# Patient Record
Sex: FEMALE | Race: WHITE | NOT HISPANIC OR LATINO | Employment: OTHER | URBAN - METROPOLITAN AREA
[De-identification: names, ages, dates, MRNs, and addresses within clinical notes are randomized per-mention and may not be internally consistent; named-entity substitution may affect disease eponyms.]

---

## 2017-05-18 ENCOUNTER — APPOINTMENT (EMERGENCY)
Dept: RADIOLOGY | Facility: HOSPITAL | Age: 72
End: 2017-05-18
Payer: MEDICARE

## 2017-05-18 ENCOUNTER — HOSPITAL ENCOUNTER (EMERGENCY)
Facility: HOSPITAL | Age: 72
Discharge: HOME | End: 2017-05-18
Attending: EMERGENCY MEDICINE | Admitting: EMERGENCY MEDICINE
Payer: MEDICARE

## 2017-05-18 VITALS
BODY MASS INDEX: 27.43 KG/M2 | TEMPERATURE: 97.6 F | HEIGHT: 61 IN | RESPIRATION RATE: 16 BRPM | HEART RATE: 66 BPM | WEIGHT: 145.28 LBS | DIASTOLIC BLOOD PRESSURE: 95 MMHG | SYSTOLIC BLOOD PRESSURE: 166 MMHG | OXYGEN SATURATION: 98 %

## 2017-05-18 DIAGNOSIS — S92.909A FOOT FRACTURE: Primary | ICD-10-CM

## 2017-05-18 DIAGNOSIS — S92.309A METATARSAL FRACTURE: ICD-10-CM

## 2017-05-18 PROCEDURE — 73610 X-RAY EXAM OF ANKLE: CPT

## 2017-05-18 PROCEDURE — 99283 EMERGENCY DEPT VISIT LOW MDM: CPT

## 2017-05-18 PROCEDURE — 73630 X-RAY EXAM OF FOOT: CPT

## 2017-05-18 RX ORDER — ACYCLOVIR 400 MG/1
400 TABLET ORAL DAILY
COMMUNITY

## 2017-05-18 RX ORDER — LEVOTHYROXINE SODIUM 0.05 MG/1
50 TABLET ORAL DAILY
COMMUNITY

## 2017-05-18 RX ORDER — PROPRANOLOL HYDROCHLORIDE 20 MG/1
20 TABLET ORAL DAILY
COMMUNITY

## 2017-05-18 RX ORDER — ASPIRIN 81 MG/1
81 TABLET, CHEWABLE ORAL DAILY
COMMUNITY

## 2017-11-13 ENCOUNTER — ALLSCRIPTS OFFICE VISIT (OUTPATIENT)
Dept: OTHER | Facility: OTHER | Age: 72
End: 2017-11-13

## 2017-11-13 RX ORDER — MAGNESIUM 30 MG
30 TABLET ORAL 2 TIMES DAILY
COMMUNITY

## 2017-11-13 RX ORDER — MULTIVIT WITH MINERALS/LUTEIN
1000 TABLET ORAL DAILY
COMMUNITY

## 2017-11-13 RX ORDER — NAPROXEN 375 MG/1
250 TABLET ORAL 2 TIMES DAILY WITH MEALS
COMMUNITY
End: 2018-12-11

## 2017-11-13 RX ORDER — OMEGA-3 FATTY ACIDS CAP DELAYED RELEASE 1000 MG 1000 MG
CAPSULE DELAYED RELEASE ORAL
COMMUNITY

## 2017-11-13 RX ORDER — IBUPROFEN 200 MG
1 CAPSULE ORAL DAILY
COMMUNITY

## 2017-11-14 ENCOUNTER — ANESTHESIA EVENT (OUTPATIENT)
Dept: PERIOP | Facility: HOSPITAL | Age: 72
End: 2017-11-14
Payer: MEDICARE

## 2017-11-14 RX ORDER — SODIUM CHLORIDE, SODIUM LACTATE, POTASSIUM CHLORIDE, CALCIUM CHLORIDE 600; 310; 30; 20 MG/100ML; MG/100ML; MG/100ML; MG/100ML
75 INJECTION, SOLUTION INTRAVENOUS CONTINUOUS
Status: CANCELLED | OUTPATIENT
Start: 2017-11-14

## 2017-11-14 NOTE — PROGRESS NOTES
Assessment    1  Menopausal symptoms (627 2) (N95 1)   2  Encounter for gynecological examination without abnormal finding (V72 31) (Z01 419)    Plan  Menopausal symptoms    · Clotrimazole-Betamethasone 1-0 05 % External Cream; APPLY  AND RUB  IN ATHIN FILM TO AFFECTED AREAS TWICE DAILY  (AM AND PM)   Rx By: Harvey Allred; Dispense: 0 Days ; #:1 X 45 GM Tube; Refill: 0;For: Menopausal symptoms; RUDY = N; Verified Transmission to [facility]; Last Updated By: System, Atavist; 11/13/2017 8:24:59 PM   · Menest 0 3 MG Oral Tablet; TAKE 1 TABLET DAILY AS DIRECTED   Rx By: Harvey Allred; Dispense: 30 Days ; #:30 Tablet; Refill: 11; Menopausal symptoms; RUDY = N; Verified Transmission to [facility]; Last Updated By: System Atavist; 11/13/2017 8:25:00 PM    Discussion/Summary  healthy adult female cervical cancer screening is not indicated Breast cancer screening: mammogram has been ordered  Colorectal cancer screening: colorectal cancer screening is current  Osteoporosis screening: bone mineral density testing is current  Advice and education were given regarding weight bearing exercise, calcium supplements and vitamin D supplements  Annual exam performedrx Arty Craw year  The patient has the current Goals: Mammo, RTO 1 year  The patent has the current Barriers: None  Patient is able to Self-Care  Self Referrals: Yes      Chief Complaint  Pt here for yearly doing well today  History of Present Illness  HPI: 74 y/o [de-identified] female here for a new patient annual GYN exam  No menses, pt had supracervical hysterectomy BSO 1999 secondary to fibroids and ovarian cysts  Pt denies any vaginal bleeding or problems since surgery  Not sexually active at this time  Pt states she had abnl pap in late 1970s then had colpo which showed PIPPA 2-3, and cone biopsy, pt states since then paps all normal  last mammo 8/2017 WNL    on menest 0 3mg daily for menopausal symptoms and pt feels great on it does not want to d/c it  wy-awgrvnybonpivzli-xmebfgsyqlex 1999    GYN HM, Adult Female Ugo Hogan: The patient is being seen for a Annual Gyn Exam evaluation  General Health: The patient's health since the last visit is described as good  Lifestyle:  She does not have any weight concerns  -- She exercises regularly  She exercises 3 or more times per week  Exercise includes walking -- She does not use tobacco  The patient is a former cigarette smoker  Tobacco Use Duration: cigarette pack(s) per day-- and-- quit 17 yrs ago  -- She consumes alcohol  She reports occasional alcohol use  -- She denies drug use  She has never used illicit drugs  Reproductive health: the patient is postmenopausal--   she reports no menstrual problems  -- she uses no contraception  -- she is not sexually active  -- she denies prior pregnancies G 0  Screening: Cervical cancer screening includes uncertain timing of her last pap smear,-- uncertain timing of her last human papilloma virus screening-- and-- uncertain timing of her last colposcopy  Breast cancer screening includes a mammogram performed 9/9/2017  Colorectal cancer screening includes a colonoscopy performed 5 yrs ago  Review of Systems   Constitutional: no fever-- and-- no chills  Cardiovascular: no chest pain  Respiratory: no shortness of breath  Breasts: no breast pain-- and-- no breast lump  Gastrointestinal: no abdominal pain  Genitourinary: no dysuria,-- no pelvic pain,-- no vaginal discharge,-- no dysmenorrhea-- and-- no unexplained vaginal bleeding  Active Problems  1  Visit for screening mammogram (V76 12) (Z12 31)    Past Medical History   · History of arthritis (V13 4) (Z87 39)   · History of migraine headaches (V12 49) (Z86 69)    The active problems and past medical history were reviewed and updated today  Surgical History   · History of Hysterectomy    The surgical history was reviewed and updated today         Family History  Mother    · Family history of hypertension (V17 49) (Z82 49)  Father    · Family history of Bowel disease   · Family history of arthritis (V17 7) (Z82 61)   · Family history of colon cancer (V16 0) (Z80 0)   · Family history of lung cancer (V16 1) (Z80 1)  Brother    · Family history of diabetes mellitus (V18 0) (Z83 3)   · Family history of hypertension (V17 49) (Z82 49)   · Family history of kidney disease (V18 69) (Z84 1)   · Family history of migraine headaches (V17 2) (Z82 0)   · Family history of Headache    The family history was reviewed and updated today  Social History     · Former smoker (A48 20) (S93 103)   · Social alcohol use (Z78 9)   ·  (V61 07) (Z63 4)  The social history was reviewed and updated today  Current Meds   1  Acyclovir 400 MG Oral Tablet; Therapy: (Recorded:13Nov2017) to Recorded   2  Baby Aspirin 81 MG CHEW; Therapy: (Recorded:13Nov2017) to Recorded   3  Calcium CAPS; Therapy: (Recorded:13Nov2017) to Recorded   4  Fish Oil CAPS; Therapy: (Recorded:13Nov2017) to Recorded   5  Inderal 20 MG TABS; Therapy: (Recorded:13Nov2017) to Recorded   6  Magnesium TABS; Therapy: (Recorded:13Nov2017) to Recorded   7  Multivitamins TABS; Therapy: (Recorded:13Nov2017) to Recorded   8  Naproxen TABS; Therapy: (Recorded:13Nov2017) to Recorded   9  Synthroid 50 MCG Oral Tablet; Therapy: (Recorded:13Nov2017) to Recorded   10  Vitamin C TABS; Therapy: (Recorded:13Nov2017) to Recorded   11  Vitamin D3 CAPS; Therapy: (Recorded:13Nov2017) to Recorded    Allergies  1  No Known Drug Allergies    Vitals   Recorded: [id number] 99:11ZR   Systolic 588, RUE, Sitting   Diastolic 68, RUE, Sitting   Height 5 ft    Weight 147 lb    BMI Calculated 28 71   BSA Calculated 1 64   LMP Hyst        Physical Exam   Constitutional  General appearance: No acute distress, well appearing and well nourished  Neck  Thyroid: Normal, no thyromegaly  Pulmonary  Respiratory effort: No increased work of breathing or signs of respiratory distress  Auscultation of lungs: Clear to auscultation  Cardiovascular  Auscultation of heart: Normal rate and rhythm, normal S1 and S2, no murmurs  Genitourinary  External genitalia: Normal and no lesions appreciated  Vagina: Normal, no lesions or dryness appreciated  Urethra: Normal    Urethral meatus: Normal    Bladder: Normal, soft, non-tender and no prolapse or masses appreciated  Cervix: Normal, no palpable masses  Uterus: Surgically absent  Adnexa/parametria: Surgically absent  Chest  Breasts: Normal and no dimpling or skin changes noted  Abdomen  Abdomen: Normal, non-tender, and no organomegaly noted  Psychiatric  Orientation to person, place, and time: Normal    Mood and affect: Normal        Future Appointments    Date/Time Provider Specialty Site   11/15/2017 10:15 AM Rebekah Guzman MD Gastroenterology Adult [address]   02/19/2018 10:30 AM MARIE Monique   Dermatology [facility]       Signatures   Electronically signed by : Manpreet Torres, [facility]; Nov 13 2017  8:37PM EST                       (Author)    Electronically signed by : MARIE Jacome ; Nov 13 2017  8:41PM EST                       (Author)

## 2017-11-15 ENCOUNTER — GENERIC CONVERSION - ENCOUNTER (OUTPATIENT)
Dept: OTHER | Facility: OTHER | Age: 72
End: 2017-11-15

## 2017-11-15 ENCOUNTER — ANESTHESIA (OUTPATIENT)
Dept: PERIOP | Facility: HOSPITAL | Age: 72
End: 2017-11-15
Payer: MEDICARE

## 2017-11-15 ENCOUNTER — HOSPITAL ENCOUNTER (OUTPATIENT)
Facility: HOSPITAL | Age: 72
Setting detail: OUTPATIENT SURGERY
Discharge: HOME | End: 2017-11-15
Attending: INTERNAL MEDICINE | Admitting: INTERNAL MEDICINE
Payer: MEDICARE

## 2017-11-15 VITALS
SYSTOLIC BLOOD PRESSURE: 123 MMHG | BODY MASS INDEX: 27.18 KG/M2 | OXYGEN SATURATION: 98 % | DIASTOLIC BLOOD PRESSURE: 61 MMHG | WEIGHT: 143.96 LBS | TEMPERATURE: 97.1 F | HEIGHT: 61 IN | HEART RATE: 65 BPM | RESPIRATION RATE: 18 BRPM

## 2017-11-15 RX ORDER — PROPOFOL 10 MG/ML
INJECTION, EMULSION INTRAVENOUS AS NEEDED
Status: DISCONTINUED | OUTPATIENT
Start: 2017-11-15 | End: 2017-11-15 | Stop reason: SURG

## 2017-11-15 RX ORDER — SODIUM CHLORIDE, SODIUM LACTATE, POTASSIUM CHLORIDE, CALCIUM CHLORIDE 600; 310; 30; 20 MG/100ML; MG/100ML; MG/100ML; MG/100ML
INJECTION, SOLUTION INTRAVENOUS CONTINUOUS PRN
Status: DISCONTINUED | OUTPATIENT
Start: 2017-11-15 | End: 2017-11-15 | Stop reason: SURG

## 2017-11-15 RX ADMIN — SODIUM CHLORIDE, SODIUM LACTATE, POTASSIUM CHLORIDE, AND CALCIUM CHLORIDE: .6; .31; .03; .02 INJECTION, SOLUTION INTRAVENOUS at 10:23

## 2017-11-15 RX ADMIN — PROPOFOL 30 MG: 10 INJECTION, EMULSION INTRAVENOUS at 10:55

## 2017-11-15 RX ADMIN — PROPOFOL 50 MG: 10 INJECTION, EMULSION INTRAVENOUS at 10:52

## 2017-11-15 RX ADMIN — PROPOFOL 100 MG: 10 INJECTION, EMULSION INTRAVENOUS at 10:49

## 2017-11-15 NOTE — OP NOTE
**** GI/ENDOSCOPY REPORT ****     PATIENT NAME: Kayden Pike ------ VISIT ID:  Patient ID:   [id number] YOB: 1945     INTRODUCTION: Colonoscopy - A 73 female patient presents for an outpatient   Colonoscopy at [address] [place] [address]  PREVIOUS COLONOSCOPY: Patient's last colonoscopy was 5 years ago  INDICATIONS: Surveillance  Average-risk screening for colon cancer  Screening for family history of colorectal cancer, including the patient's   father  CONSENT:  The benefits, risks, and alternatives to the procedure were   discussed and informed consent was obtained from the patient  PREPARATION: EKG, pulse, pulse oximetry and blood pressure were monitored   throughout the procedure  The patient was identified by myself both   verbally and by visual inspection of ID band  Airway Assessment   Classification: Airway class 2 - Visualization of the soft palate, fauces   and uvula  ASA Classification: Class 2 - Patient has mild to moderate   systemic disturbance that may or may not be related to the disorder   requiring surgery  The patient was kept NPO for eight hours prior to the   procedure  The patient received Nulytely in preparation for the procedure  MEDICATIONS: Anesthesia-check records MAC anesthesia  PROCEDURE:  The endoscope was passed without difficulty through the anus   under direct visualization and advanced to the cecum, confirmed by   appendiceal orifice and ileocecal valve  The scope was withdrawn and the   mucosa was carefully examined  The quality of the preparation was good  Cecal Intubation Time: 9 minutes(s) Scope Withdrawal Time: 4 minutes(s)     RECTAL EXAM: Normal rectal exam      FINDINGS:  There was evidence of diverticulosis in the ascending colon,   descending colon, and sigmoid colon   The cecum, hepatic flexure,   transverse colon, splenic flexure, rectosigmoid junction, and rectum   appeared to be normal      COMPLICATIONS: There were no complications  IMPRESSIONS: Diverticulosis found in the ascending colon, descending   colon, and sigmoid colon  Normal cecum, hepatic flexure, transverse colon,   splenic flexure, rectosigmoid junction, and rectum  RECOMMENDATIONS: Colonoscopy recommended in 5 years  ESTIMATED BLOOD LOSS: None  PATHOLOGY SPECIMENS: No     PROCEDURE CODES: Colonoscopy     ICD-9 Codes: V76 51 Special screening for malignant neoplasms of colon   V16 0 Family history of malignant neoplasm of gastrointestinal tract   562 10 Diverticulosis of colon (without mention of hemorrhage)     ICD-10 Codes: Z12 11 Encounter for screening for malignant neoplasm of   colon Z80 0 Family history of malignant neoplasm of digestive organs K57   Diverticular disease of intestine     PERFORMED BY: MARIE Braxton  on 11/15/2017  Version 1, electronically signed by MARIE Kiran  on   11/15/2017 at 11:06

## 2017-11-15 NOTE — DISCHARGE INSTRUCTIONS
Colonoscopy   WHAT YOU NEED TO KNOW:   A colonoscopy is a procedure to examine the inside of your colon (intestine) with a scope  Polyps or tissue growths may have been removed during your colonoscopy  It is normal to feel bloated and to have some abdominal discomfort  You should be passing gas  If you have hemorrhoids or you had polyps removed, you may have a small amount of bleeding  DISCHARGE INSTRUCTIONS:   Seek care immediately if:   · You have a large amount of bright red blood in your bowel movements  · Your abdomen is hard and firm and you have severe pain  · You have sudden trouble breathing  Contact your healthcare provider if:   · You develop a rash or hives  · You have a fever within 24 hours of your procedure  · You have not had a bowel movement for 3 days after your procedure  · You have questions or concerns about your condition or care  Activity:   · Do not lift, strain, or run  for 3 days after your procedure  · Rest after your procedure  You have been given medicine to relax you  Do not  drive or make important decisions until the day after your procedure  Return to your normal activity as directed  · Relieve gas and discomfort from bloating  by lying on your right side with a heating pad on your abdomen  You may need to take short walks to help the gas move out  Eat small meals until bloating is relieved  If you had polyps removed: For 7 days after your procedure:    · Do not  go on long car rides  Help prevent constipation:   · Eat a variety of healthy foods  Healthy foods include fruit, vegetables, whole-grain breads, low-fat dairy products, beans, lean meat, and fish  Ask if you need to be on a special diet  Your healthcare provider may recommend that you eat high-fiber foods such as cooked beans  Fiber helps you have regular bowel movements  · Drink liquids as directed  Adults should drink between 9 and 13 eight-ounce cups of liquid every day   Ask what amount is best for you  For most people, good liquids to drink are water, juice, and milk  · Exercise as directed  Talk to your healthcare provider about the best exercise plan for you  Exercise can help prevent constipation, decrease your blood pressure and improve your health  Follow up with your healthcare provider as directed:  Write down your questions so you remember to ask them during your visits  © 2017 [employer]  Information is for End User's use only and may not be sold, redistributed or otherwise used for commercial purposes  All illustrations and images included in CareNotes® are the copyrighted property of [employer] , [facility]  or [employer]  The above information is an  only  It is not intended as medical advice for individual conditions or treatments  Talk to your doctor, nurse or pharmacist before following any medical regimen to see if it is safe and effective for you

## 2017-11-15 NOTE — ANESTHESIA PREPROCEDURE EVALUATION
Review of Systems/Medical History  Patient summary reviewed  Chart reviewed  No history of anesthetic complications     Cardiovascular  Negative cardio ROS    Pulmonary  Negative pulmonary ROS ,        GI/Hepatic  Negative GI/hepatic ROS   Bowel prep       Negative  ROS        Endo/Other  Negative endo/other ROS History of thyroid disease , hypothyroidism,      GYN       Hematology  Negative hematology ROS      Musculoskeletal  Negative musculoskeletal ROS        Neurology  Negative neurology ROS     Comment: migraine - last one yesterday Psychology   Negative psychology ROS            Physical Exam    Airway    Mallampati score: II  TM Distance: <3 FB  Neck ROM: full     Dental   Comment: Lower front four teeth bound to reduce "looseness"  Problem originated with traumatic intubation at [facility] ,     Cardiovascular  Comment: Negative ROS, Rhythm: regular, Rate: normal,     Pulmonary  Pulmonary exam normal     Other Findings        Anesthesia Plan  ASA Score- 2       Anesthesia Type- IV sedation with anesthesia with ASA Monitors  Additional Monitors:   Airway Plan:           Induction- intravenous  Informed Consent- Anesthetic plan and risks discussed with patient  I personally reviewed this patient with the CRNA  Discussed and agreed on the Anesthesia Plan with the CRNA  Gayla Briceño

## 2018-01-13 VITALS
HEIGHT: 60 IN | SYSTOLIC BLOOD PRESSURE: 122 MMHG | DIASTOLIC BLOOD PRESSURE: 68 MMHG | WEIGHT: 147 LBS | BODY MASS INDEX: 28.86 KG/M2

## 2018-02-19 ENCOUNTER — OFFICE VISIT (OUTPATIENT)
Dept: DERMATOLOGY | Facility: CLINIC | Age: 73
End: 2018-02-19
Payer: MEDICARE

## 2018-02-19 DIAGNOSIS — L20.81 ATOPIC NEURODERMATITIS: Primary | ICD-10-CM

## 2018-02-19 DIAGNOSIS — Z13.89 SCREENING FOR SKIN CONDITION: ICD-10-CM

## 2018-02-19 DIAGNOSIS — L82.0 INFLAMED SEBORRHEIC KERATOSIS: ICD-10-CM

## 2018-02-19 DIAGNOSIS — L82.1 SEBORRHEIC KERATOSIS: ICD-10-CM

## 2018-02-19 PROCEDURE — 99203 OFFICE O/P NEW LOW 30 MIN: CPT | Performed by: DERMATOLOGY

## 2018-02-19 PROCEDURE — 17110 DESTRUCTION B9 LES UP TO 14: CPT | Performed by: DERMATOLOGY

## 2018-02-19 RX ORDER — FLUTICASONE PROPIONATE 0.05 %
CREAM (GRAM) TOPICAL 2 TIMES DAILY
Qty: 15 G | Refills: 1 | Status: SHIPPED | OUTPATIENT
Start: 2018-02-19 | End: 2018-05-14 | Stop reason: SDUPTHER

## 2018-02-19 NOTE — PATIENT INSTRUCTIONS
Treatment with Cryotherapy    The doctor has treated your skin with nitrogen, which is 320 degrees Fahrenheit below zero  He has given the treated area "frostbite "    Stinging should subside within a few hours  You can take Tylenol for pain, if needed  Over the next few days, it is normal if the area becomes reddened, a blood blister, or swollen with fluid  If the lesion treated was near the eye - you could get a swollen eye over the next few days  Do not panic! This is all temporary, and will resolve with time  There is no special treatment - just keep the area clean  Makeup and BandAids can be used, if preferred  When the area starts to dry up and peel off, using Vaseline can help healing  It usually takes up to a month for it to heal   Some lesions are recurrent and may require repeat treatments  If a lesion has not healed in one month, please don't hesitate to contact us  If you have any further questions that are not answered here, please call us  [phone]    Thank you for allowing us to care for you  Atopic dermatitis will go ahead treat with topical steroids follow-up if further problems arise or in 1 year discussed use of moisturizes mild soaps avoidance of irritants   Seborrheic Keratosis  Patient reasurred these are normal growths we acquire with age no treatment needed    inflamed seborrheic keratosis lesions treated because the patient concern and irritation  Screening for Dermatologic Disorders: Nothing else of concern noted on complete exam follow up in 1 year

## 2018-02-19 NOTE — PROGRESS NOTES
[address] [facility]  [address] [address]     MRN: [id number] : 1945  Encounter: [id number]  Patient Information: Leo Mckee  Chief complaint:  Itchy spots on her back as well as history of eczema    History of present illness:  79-year-old female presents for overall skin check history of eczema patient was  to a physician for many years  Patient notably has had patches of eczema seem to come and go nothing active at this time wishes a prescription to have available  Also complaining of growths on her back that get itchy especially near her bra strap  Past Medical History:   Diagnosis Date    Disease of thyroid gland     Fibroids     Migraine     Migraines      Past Surgical History:   Procedure Laterality Date    ANKLE FRACTURE SURGERY Left     HYSTERECTOMY      SC COLONOSCOPY FLX DX W/COLLJ SPEC WHEN PFRMD N/A 11/15/2017    Procedure: COLONOSCOPY;  Surgeon: Marv Bañuelos MD;  Location: [facility]; Service: Gastroenterology    TIBIAL PLATEAU HARDWARE REMOVAL Left     TONSILLECTOMY       Social History   History   Alcohol Use No     History   Drug Use No     History   Smoking Status    Former Smoker   Smokeless Tobacco    Never Used     No family history on file  Meds/Allergies   Allergies   Allergen Reactions    Other Hives     Steri strips/adhesive tape       Meds:  Prior to Admission medications    Medication Sig Start Date End Date Taking?  Authorizing Provider   acyclovir (ZOVIRAX) 400 MG tablet Take 400 mg by mouth daily   Yes Historical Provider, MD   Ascorbic Acid (VITAMIN C) 1000 MG tablet Take 1,000 mg by mouth daily   Yes Historical Provider, MD   aspirin 81 mg chewable tablet Chew 81 mg daily   Yes Historical Provider, MD   calcium carbonate (OS-MIGUEL) 1250 (500 Ca) MG tablet Take 1 tablet by mouth daily   Yes Historical Provider, MD   esterified estrogens (MENEST) 0 625 MG tablet Take 0 625 mg by mouth daily   Yes Historical Provider, MD   levothyroxine 50 mcg tablet Take 50 mcg by mouth daily   Yes Historical Provider, MD   magnesium 30 MG tablet Take 30 mg by mouth 2 (two) times a day   Yes Historical Provider, MD   Omega-3 Fatty Acids (FISH OIL) 1000 MG CPDR Take by mouth   Yes Historical Provider, MD   propranolol (INDERAL) 20 mg tablet Take 20 mg by mouth daily   Yes Historical Provider, MD   vitamin E, tocopherol, 1,000 units capsule Take 1,000 Units by mouth daily   Yes Historical Provider, MD   naproxen (NAPROSYN) 375 mg tablet Take 250 mg by mouth 2 (two) times a day with meals    Historical Provider, MD       Subjective:     Review of Systems:    General: negative for - chills, fatigue, fever,  weight gain or weight loss  Psychological: negative for - anxiety, behavioral disorder, concentration difficulties, decreased libido, depression, irritability, memory difficulties, mood swings, sleep disturbances or suicidal ideation  ENT: negative for - hearing difficulties , nasal congestion, nasal discharge, oral lesions, sinus pain, sneezing, sore throat  Allergy and Immunology: negative for - hives, insect bite sensitivity,  Hematological and Lymphatic: negative for - bleeding problems, blood clots,bruising, swollen lymph nodes  Endocrine: negative for - hair pattern changes, hot flashes, malaise/lethargy, mood swings, palpitations, polydipsia/polyuria, skin changes, temperature intolerance or unexpected weight change  Respiratory: negative for - cough, hemoptysis, orthopnea, shortness of breath, or wheezing  Cardiovascular: negative for - chest pain, dyspnea on exertion, edema,  Gastrointestinal: negative for - abdominal pain, nausea/vomiting  Genito-Urinary: negative for - dysuria, incontinence, irregular/heavy menses or urinary frequency/urgency  Musculoskeletal: negative for - gait disturbance, joint pain, joint stiffness, joint swelling, muscle pain, muscular weakness  Dermatological:  As in HPI  Neurological: negative for confusion, dizziness, headaches, impaired coordination/balance, memory loss, numbness/tingling, seizures, speech problems, tremors or weakness       Objective: There were no vitals taken for this visit  Physical Exam:    General Appearance:    Alert, cooperative, no distress   Head:    Normocephalic, without obvious abnormality, atraumatic           Skin:   A full skin exam was performed including scalp, head scalp, eyes, ears, nose, lips, neck, chest, axilla, abdomen, back, buttocks, bilateral upper extremities, bilateral lower extremities, hands, feet, fingers, toes, fingernails, and toenails no active atopic dermatitis noted normal keratotic papules with greasy stuck on appearance several lesions on the upper back slightly inflamed nothing else remarkable noted on complete cutaneous exam  Cryotherapy Procedure Note    Pre-operative Diagnosis: inflamed seborrheic keratosis    Plan:  1  Instructed to keep the area dry and clean thereafter  Apply petrolatum if area gets crusty  2  Recommended that the patient use acetaminophen  as needed for pain  Locations: L back     Indications: Destruction of inflamed seborrheic keratosis x3    Patient informed of risks (permanent scarring, infection, light or dark discoloration, bleeding, infection, weakness, numbness and recurrence of the lesion) and benefits of the procedure and verbal informed consent obtained  The areas are treated with liquid nitrogen therapy, frozen until ice ball extended 2 mm beyond lesion, allowed to thaw, and treated again  The patient tolerated procedure well  The patient was instructed on post-op care, warned that there may be blister formation, redness and pain  Recommend OTC analgesia as needed for pain  Condition:  Stable    Complications:  none  Assessment:     1  Atopic neurodermatitis  fluticasone (CUTIVATE) 0 05 % cream   2  Seborrheic keratosis     3  Screening for skin condition     4  Inflamed seborrheic keratosis           Plan:   Atopic dermatitis will go ahead treat with topical steroids follow-up if further problems arise or in 1 year discussed use of moisturizes mild soaps avoidance of irritants   Seborrheic Keratosis  Patient reasurred these are normal growths we acquire with age no treatment needed  inflamed seborrheic keratosis lesions treated because the patient concern and irritation  Screening for Dermatologic Disorders: Nothing else of concern noted on complete exam follow up in 1 year      Lamin Meehan MD  2/19/2018,10:41 AM    Portions of the record may have been created with voice recognition software   Occasional wrong word or "sound a like" substitutions may have occurred due to the inherent limitations of voice recognition software   Read the chart carefully and recognize, using context, where substitutions have occurred

## 2018-05-11 DIAGNOSIS — Z78.0 POSTMENOPAUSAL: Primary | ICD-10-CM

## 2018-05-14 DIAGNOSIS — L20.81 ATOPIC NEURODERMATITIS: ICD-10-CM

## 2018-05-14 NOTE — TELEPHONE ENCOUNTER
Pt is on menest 0 3 and a 90 day supply  She needs refills  Per chart, she had 30 and 11 refills  Roberto [place]   I put refill into epic

## 2018-05-20 RX ORDER — FLUTICASONE PROPIONATE 0.05 %
CREAM (GRAM) TOPICAL
Qty: 30 G | Refills: 1 | Status: SHIPPED | OUTPATIENT
Start: 2018-05-20 | End: 2018-12-11

## 2018-08-10 DIAGNOSIS — Z12.31 ENCOUNTER FOR SCREENING MAMMOGRAM FOR MALIGNANT NEOPLASM OF BREAST: ICD-10-CM

## 2018-11-12 ENCOUNTER — TELEPHONE (OUTPATIENT)
Dept: OBGYN CLINIC | Facility: CLINIC | Age: 73
End: 2018-11-12

## 2018-11-12 NOTE — TELEPHONE ENCOUNTER
Pt wants to get an estrogen cream but not premarin cream, for "religous reasons" said she was getting [place] in [place] , explained to pt we need to see you in order to give you a new rx , apt made to discuss creams

## 2018-11-12 NOTE — TELEPHONE ENCOUNTER
Patient currently has Medicare and is not yet due for her year;y exam  Patient is requesting a refill of her hormone medication and vagina cream  Patient stated she would like to speak to someone because she does have some questions

## 2018-12-11 ENCOUNTER — OFFICE VISIT (OUTPATIENT)
Dept: OBGYN CLINIC | Facility: MEDICAL CENTER | Age: 73
End: 2018-12-11
Payer: MEDICARE

## 2018-12-11 VITALS — DIASTOLIC BLOOD PRESSURE: 76 MMHG | BODY MASS INDEX: 27.81 KG/M2 | WEIGHT: 147.2 LBS | SYSTOLIC BLOOD PRESSURE: 122 MMHG

## 2018-12-11 DIAGNOSIS — Z78.0 POSTMENOPAUSAL: ICD-10-CM

## 2018-12-11 DIAGNOSIS — Z01.419 ENCOUNTER FOR GYNECOLOGICAL EXAMINATION (GENERAL) (ROUTINE) WITHOUT ABNORMAL FINDINGS: Primary | ICD-10-CM

## 2018-12-11 PROCEDURE — G0101 CA SCREEN;PELVIC/BREAST EXAM: HCPCS | Performed by: PHYSICIAN ASSISTANT

## 2018-12-11 RX ORDER — ESTRADIOL 0.1 MG/G
CREAM VAGINAL
Qty: 42.5 G | Refills: 3 | Status: SHIPPED | OUTPATIENT
Start: 2018-12-11

## 2018-12-11 NOTE — ASSESSMENT & PLAN NOTE
Annual exam performed  Discussed weaning off HRT since no symptoms and since on for so long, pt very resistant, discussed possible side effects including stroke and blood clots  Discussed slowly weaning and seeing how she feels, pt agrees to try    Refills sent via EMR  RTO 1 year

## 2018-12-11 NOTE — PROGRESS NOTES
Assessment/Plan  Problem List Items Addressed This Visit     Encounter for gynecological examination (general) (routine) without abnormal findings - Primary     Annual exam performed  Discussed weaning off HRT since no symptoms and since on for so long, pt very resistant, discussed possible side effects including stroke and blood clots  Discussed slowly weaning and seeing how she feels, pt agrees to try  Refills sent via EMR  RTO 1 year         Relevant Medications    estradiol (ESTRACE) 0 1 mg/g vaginal cream      Other Visit Diagnoses     Postmenopausal        Relevant Medications    esterified estrogens (MENEST) 0 3 MG tablet    estradiol (ESTRACE) 0 1 mg/g vaginal cream        Subjective   Espiridion Shar is a 72 y o  female who presents for annual GYN exam  She denies any changes in Medical or Surgical histories  Family hx changes- dx with non-hodgkins lymphoma, doing well  No menses, and she denies postmenopausal bleeding, spotting, or discharge  She denies that she is sexually active at this time  Using estrogen cream for vaginal dryness  Patient on premarin cream and menast since  SANTO, never had menopausal symptoms    Last Pap smear:   Regular self breast exam: yes  Last mammogram: 2017    Family history of uterine or ovarian cancer: no  Family history of breast cancer: no  Family history of colon cancer: no    Menstrual History:  OB History      Para Term  AB Living    0 0 0 0 0 0    SAB TAB Ectopic Multiple Live Births    0 0 0 0 0         No LMP recorded  Patient is postmenopausal        The following portions of the patient's history were reviewed and updated as appropriate: allergies, current medications, past family history, past medical history, past social history, past surgical history and problem list     Review of Systems  Review of Systems   Constitutional: Negative for chills and fever  Respiratory: Negative for shortness of breath      Cardiovascular: Negative for chest pain  Gastrointestinal: Negative for abdominal pain  Genitourinary: Negative for dysuria, pelvic pain, vaginal bleeding, vaginal discharge and vaginal pain  Negative for breast pain or lumps  Negative for stress urinary incontinence  Neurological: Negative for headaches  Objective   /76 (BP Location: Right arm)   Wt 66 8 kg (147 lb 3 2 oz)   BMI 27 81 kg/m²     Physical Exam   Constitutional: She is oriented to person, place, and time  She appears well-developed and well-nourished  Neck: No thyromegaly present  Cardiovascular: Normal rate and regular rhythm  Pulmonary/Chest: Effort normal and breath sounds normal  Right breast exhibits no mass, no nipple discharge, no skin change and no tenderness  Left breast exhibits no mass, no nipple discharge, no skin change and no tenderness  Abdominal: Soft  There is no tenderness  There is no rebound and no guarding  Genitourinary: There is no rash or lesion on the right labia  There is no rash or lesion on the left labia  No bleeding in the vagina  No vaginal discharge found  Neurological: She is alert and oriented to person, place, and time  Psychiatric: She has a normal mood and affect  Nursing note and vitals reviewed

## 2018-12-19 ENCOUNTER — TELEPHONE (OUTPATIENT)
Dept: OBGYN CLINIC | Facility: CLINIC | Age: 73
End: 2018-12-19

## 2019-03-06 ENCOUNTER — OFFICE VISIT (OUTPATIENT)
Dept: DERMATOLOGY | Facility: CLINIC | Age: 74
End: 2019-03-06
Payer: MEDICARE

## 2019-03-06 DIAGNOSIS — Z13.89 SCREENING FOR SKIN CONDITION: ICD-10-CM

## 2019-03-06 DIAGNOSIS — L82.1 SEBORRHEIC KERATOSIS: Primary | ICD-10-CM

## 2019-03-06 DIAGNOSIS — L82.0 INFLAMED SEBORRHEIC KERATOSIS: ICD-10-CM

## 2019-03-06 PROCEDURE — 99213 OFFICE O/P EST LOW 20 MIN: CPT | Performed by: DERMATOLOGY

## 2019-03-06 PROCEDURE — 17110 DESTRUCTION B9 LES UP TO 14: CPT | Performed by: DERMATOLOGY

## 2019-03-06 RX ORDER — DIPHENOXYLATE HYDROCHLORIDE AND ATROPINE SULFATE 2.5; .025 MG/1; MG/1
1 TABLET ORAL DAILY
COMMUNITY

## 2019-03-06 RX ORDER — MELATONIN
1000 DAILY
COMMUNITY

## 2019-03-06 NOTE — PROGRESS NOTES
[facility] DERMATOLOGY  [address] [place] [address]  [phone]  [phone]     MRN: [id number] : 1945  Encounter: [id number]  Patient Information: Joseph Martin  Chief complaint:  Yearly checkup    History of present illness:  72-year-old female with previous history of eczema presents for overall checkup patient with numerous keratosis that she has had some appear to be regarding her 1 on the eyebrow which he has been treating with the topical steroid which did seem to help  Past Medical History:   Diagnosis Date    Disease of thyroid gland     Fibroids     Migraine     Migraines      Past Surgical History:   Procedure Laterality Date    ANKLE FRACTURE SURGERY Left     HYSTERECTOMY      UT COLONOSCOPY FLX DX W/COLLJ SPEC WHEN PFRMD N/A 11/15/2017    Procedure: COLONOSCOPY;  Surgeon: Nisa Rose MD;  Location: [facility]; Service: Gastroenterology    TIBIAL PLATEAU HARDWARE REMOVAL Left     TONSILLECTOMY       Social History   Social History     Substance and Sexual Activity   Alcohol Use No     Social History     Substance and Sexual Activity   Drug Use No     Social History     Tobacco Use   Smoking Status Former Smoker   Smokeless Tobacco Never Used     Family History   Problem Relation Age of Onset    Lymphoma Mother      Meds/Allergies   Allergies   Allergen Reactions    Other Hives     Steri strips/adhesive tape       Meds:  Prior to Admission medications    Medication Sig Start Date End Date Taking?  Authorizing Provider   acyclovir (ZOVIRAX) 400 MG tablet Take 400 mg by mouth daily   Yes Historical Provider, MD   Ascorbic Acid (VITAMIN C) 1000 MG tablet Take 1,000 mg by mouth daily   Yes Historical Provider, MD   aspirin 81 mg chewable tablet Chew 81 mg daily   Yes Historical Provider, MD   calcium carbonate (OS-MIGUEL) 1250 (500 Ca) MG tablet Take 1 tablet by mouth daily   Yes Historical Provider, MD   cholecalciferol (VITAMIN D3) 1,000 units tablet Take 1,000 Units by mouth daily   Yes Historical Provider, MD   esterified estrogens (MENEST) 0 3 MG tablet Take 1 tablet (0 3 mg total) by mouth daily for 90 days 12/11/18 3/11/19 Yes Marsha Horowitz PA-C   estradiol (ESTRACE) 0 1 mg/g vaginal cream 1 gm PV qhs 2-3 times per week 12/11/18  Yes Alfonso Forrester PA-C   levothyroxine 50 mcg tablet Take 50 mcg by mouth daily   Yes Historical Provider, MD   magnesium 30 MG tablet Take 30 mg by mouth 2 (two) times a day   Yes Historical Provider, MD   multivitamin (THERAGRAN) TABS Take 1 tablet by mouth daily   Yes Historical Provider, MD   Omega-3 Fatty Acids (FISH OIL) 1000 MG CPDR Take by mouth   Yes Historical Provider, MD   propranolol (INDERAL) 20 mg tablet Take 20 mg by mouth daily   Yes Historical Provider, MD   vitamin E, tocopherol, 1,000 units capsule Take 1,000 Units by mouth daily    Historical Provider, MD       Subjective:     Review of Systems:    General: negative for - chills, fatigue, fever,  weight gain or weight loss  Psychological: negative for - anxiety, behavioral disorder, concentration difficulties, decreased libido, depression, irritability, memory difficulties, mood swings, sleep disturbances or suicidal ideation  ENT: negative for - hearing difficulties , nasal congestion, nasal discharge, oral lesions, sinus pain, sneezing, sore throat  Allergy and Immunology: negative for - hives, insect bite sensitivity,  Hematological and Lymphatic: negative for - bleeding problems, blood clots,bruising, swollen lymph nodes  Endocrine: negative for - hair pattern changes, hot flashes, malaise/lethargy, mood swings, palpitations, polydipsia/polyuria, skin changes, temperature intolerance or unexpected weight change  Respiratory: negative for - cough, hemoptysis, orthopnea, shortness of breath, or wheezing  Cardiovascular: negative for - chest pain, dyspnea on exertion, edema,  Gastrointestinal: negative for - abdominal pain, nausea/vomiting  Genito-Urinary: negative for - dysuria, incontinence, irregular/heavy menses or urinary frequency/urgency  Musculoskeletal: negative for - gait disturbance, joint pain, joint stiffness, joint swelling, muscle pain, muscular weakness  Dermatological:  As in HPI  Neurological: negative for confusion, dizziness, headaches, impaired coordination/balance, memory loss, numbness/tingling, seizures, speech problems, tremors or weakness       Objective: There were no vitals taken for this visit  Physical Exam:    General Appearance:    Alert, cooperative, no distress   Head:    Normocephalic, without obvious abnormality, atraumatic           Skin:   A full skin exam was performed including scalp, head scalp, eyes, ears, nose, lips, neck, chest, axilla, abdomen, back, buttocks, bilateral upper extremities, bilateral lower extremities, hands, feet, fingers, toes, fingernails, and toenails a inflamed keratotic papule noted on the left eyebrow normal keratotic papules greasy stuck on appearance nothing else remarkable noted on exam  Cryotherapy Procedure Note    Pre-operative Diagnosis: inflamed seborrheic keratosis    Plan:  1  Instructed to keep the area dry and clean thereafter  Apply petrolatum if area gets crusty  2  Recommended that the patient use acetaminophen  as needed for pain  Locations:  Face left eyebrow    Indications: Destruction of irritated keratosis x1    Patient informed of risks (permanent scarring, infection, light or dark discoloration, bleeding, infection, weakness, numbness and recurrence of the lesion) and benefits of the procedure and verbal informed consent obtained  The areas are treated with liquid nitrogen therapy, frozen until ice ball extended 2 mm beyond lesion, allowed to thaw, and treated again  The patient tolerated procedure well  The patient was instructed on post-op care, warned that there may be blister formation, redness and pain   Recommend OTC analgesia as needed for pain  Condition:  Stable    Complications:  none  Assessment:     1  Seborrheic keratosis     2  Screening for skin condition     3  Inflamed seborrheic keratosis           Plan:   Inflamed keratosis lesion treated because the patient concern irritation  Seborrheic Keratosis  Patient reasurred these are normal growths we acquire with age no treatment needed  Screening for Dermatologic Disorders: Nothing else of concern noted on complete exam follow up in 1 year       Stanford Ramirez MD  3/6/2019,11:20 AM    Portions of the record may have been created with voice recognition software   Occasional wrong word or "sound a like" substitutions may have occurred due to the inherent limitations of voice recognition software   Read the chart carefully and recognize, using context, where substitutions have occurred

## 2019-03-06 NOTE — PATIENT INSTRUCTIONS
Inflamed keratosis lesion treated because the patient concern irritation  Seborrheic Keratosis  Patient reasurred these are normal growths we acquire with age no treatment needed  Screening for Dermatologic Disorders: Nothing else of concern noted on complete exam follow up in 1 year   Treatment with Cryotherapy    The doctor has treated your skin with nitrogen, which is 320 degrees Fahrenheit below zero  He has given the treated area "frostbite "    Stinging should subside within a few hours  You can take Tylenol for pain, if needed  Over the next few days, it is normal if the area becomes reddened, a blood blister, or swollen with fluid  If the lesion treated was near the eye - you could get a swollen eye over the next few days  Do not panic! This is all temporary, and will resolve with time  There is no special treatment - just keep the area clean  Makeup and BandAids can be used, if preferred  When the area starts to dry up and peel off, using Vaseline can help healing  It usually takes up to a month for it to heal   Some lesions are recurrent and may require repeat treatments  If a lesion has not healed in one month, please don't hesitate to contact us  If you have any further questions that are not answered here, please call us  [phone]    Thank you for allowing us to care for you

## 2019-11-18 ENCOUNTER — OFFICE VISIT (OUTPATIENT)
Dept: DERMATOLOGY | Facility: CLINIC | Age: 74
End: 2019-11-18
Payer: MEDICARE

## 2019-11-18 DIAGNOSIS — L98.9 UNKNOWN SKIN LESION: Primary | ICD-10-CM

## 2019-11-18 DIAGNOSIS — Z13.89 SCREENING FOR SKIN CONDITION: ICD-10-CM

## 2019-11-18 PROCEDURE — 99212 OFFICE O/P EST SF 10 MIN: CPT | Performed by: DERMATOLOGY

## 2019-11-18 PROCEDURE — 88305 TISSUE EXAM BY PATHOLOGIST: CPT | Performed by: STUDENT IN AN ORGANIZED HEALTH CARE EDUCATION/TRAINING PROGRAM

## 2019-11-18 PROCEDURE — 11102 TANGNTL BX SKIN SINGLE LES: CPT | Performed by: DERMATOLOGY

## 2019-11-18 NOTE — PATIENT INSTRUCTIONS
Skin lesion difficult to ascertain because of the use of Efudex advised patient that it could be a ready treated and just has not completely healed or this still could be an underlying process going on we elected to go ahead and biopsy the area  Screening for Dermatologic Disorders:  Nothing else of concern noted on cursory exam will plan follow-up again in April as previously scheduled  Wound care instructions given to patient

## 2019-11-18 NOTE — PROGRESS NOTES
[address]  [address] [facility] [address]  [phone]  [phone]     MRN: [id number] : 1945  Encounter: [id number]  Patient Information: Clarissa Anthony  Chief complaint:  Skin lesion on chest    History of present illness:  56-year-old female without previous history of any skin cancer had a lesion that developed on the chest wall and decide to use her 's Efudex cream that was available at home patient use it for about a month  Patient concerned that the areas not healed stop the medication a week  Past Medical History:   Diagnosis Date    Disease of thyroid gland     Fibroids     Migraine     Migraines      Past Surgical History:   Procedure Laterality Date    ANKLE FRACTURE SURGERY Left     HYSTERECTOMY      MI COLONOSCOPY FLX DX W/COLLJ SPEC WHEN PFRMD N/A 11/15/2017    Procedure: COLONOSCOPY;  Surgeon: Criselda Carey MD;  Location: [facility]; Service: Gastroenterology    TIBIAL PLATEAU HARDWARE REMOVAL Left     TONSILLECTOMY       Social History   Social History     Substance and Sexual Activity   Alcohol Use No     Social History     Substance and Sexual Activity   Drug Use No     Social History     Tobacco Use   Smoking Status Former Smoker   Smokeless Tobacco Never Used     Family History   Problem Relation Age of Onset    Lymphoma Mother      Meds/Allergies   Allergies   Allergen Reactions    Other Hives     Steri strips/adhesive tape       Meds:  Prior to Admission medications    Medication Sig Start Date End Date Taking?  Authorizing Provider   acyclovir (ZOVIRAX) 400 MG tablet Take 400 mg by mouth daily   Yes Historical Provider, MD   Ascorbic Acid (VITAMIN C) 1000 MG tablet Take 1,000 mg by mouth daily   Yes Historical Provider, MD   aspirin 81 mg chewable tablet Chew 81 mg daily   Yes Historical Provider, MD   calcium carbonate (OS-MIGUEL) 1250 (500 Ca) MG tablet Take 1 tablet by mouth daily   Yes Historical Provider, MD   esterified estrogens (MENEST) 0 3 MG tablet Take 1 tablet (0 3 mg total) by mouth daily for 90 days 12/11/18 11/18/19 Yes Marsha Horowitz PA-C   estradiol (ESTRACE) 0 1 mg/g vaginal cream 1 gm PV qhs 2-3 times per week 12/11/18  Yes Steffi Matthews PA-C   levothyroxine 50 mcg tablet Take 50 mcg by mouth daily   Yes Historical Provider, MD   magnesium 30 MG tablet Take 30 mg by mouth 2 (two) times a day   Yes Historical Provider, MD   multivitamin (THERAGRAN) TABS Take 1 tablet by mouth daily   Yes Historical Provider, MD   Omega-3 Fatty Acids (FISH OIL) 1000 MG CPDR Take by mouth   Yes Historical Provider, MD   propranolol (INDERAL) 20 mg tablet Take 20 mg by mouth daily   Yes Historical Provider, MD   cholecalciferol (VITAMIN D3) 1,000 units tablet Take 1,000 Units by mouth daily    Historical Provider, MD   vitamin E, tocopherol, 1,000 units capsule Take 1,000 Units by mouth daily    Historical Provider, MD       Subjective:     Review of Systems:    General: negative for - chills, fatigue, fever,  weight gain or weight loss  Psychological: negative for - anxiety, behavioral disorder, concentration difficulties, decreased libido, depression, irritability, memory difficulties, mood swings, sleep disturbances or suicidal ideation  ENT: negative for - hearing difficulties , nasal congestion, nasal discharge, oral lesions, sinus pain, sneezing, sore throat  Allergy and Immunology: negative for - hives, insect bite sensitivity,  Hematological and Lymphatic: negative for - bleeding problems, blood clots,bruising, swollen lymph nodes  Endocrine: negative for - hair pattern changes, hot flashes, malaise/lethargy, mood swings, palpitations, polydipsia/polyuria, skin changes, temperature intolerance or unexpected weight change  Respiratory: negative for - cough, hemoptysis, orthopnea, shortness of breath, or wheezing  Cardiovascular: negative for - chest pain, dyspnea on exertion, edema,  Gastrointestinal: negative for - abdominal pain, nausea/vomiting  Genito-Urinary: negative for - dysuria, incontinence, irregular/heavy menses or urinary frequency/urgency  Musculoskeletal: negative for - gait disturbance, joint pain, joint stiffness, joint swelling, muscle pain, muscular weakness  Dermatological:  As in HPI  Neurological: negative for confusion, dizziness, headaches, impaired coordination/balance, memory loss, numbness/tingling, seizures, speech problems, tremors or weakness       Objective: There were no vitals taken for this visit  Physical Exam:    General Appearance:    Alert, cooperative, no distress   Head:    Normocephalic, without obvious abnormality, atraumatic           Skin:   A full skin exam was performed including scalp, head scalp, eyes, ears, nose, lips, neck, chest, axilla, abdomen, back, buttocks, bilateral upper extremities, bilateral lower extremities, hands, feet, fingers, toes, fingernails, and toenails erythema crusting noted on the left chest wall is a 8 mm area nothing else of concern noted on cursory exam      Shave Biopsy Procedure Note    Pre-operative Diagnosis:  Rule out squamous cell carcinoma    Plan:  1  Instructed to keep the wound dry and covered for 24 and clean thereafter  2  Warning signs of infection were reviewed  3  Recommended that the patient use OTC acetaminophen as needed for pain  4  Return  Pending results of biopsy(ies)    Locations:  Left chest    Indications:  Nonhealing lesion    Anesthesia: Lidocaine 1% with epinephrine without added sodium bicarbonate    Procedure Details     Patient informed of the risks (including bleeding and infection) and benefits of the   procedure and Verbal informed consent obtained  The lesion and surrounding area were given a sterile prep using alcohol and draped in the usual sterile fashion  A Blue blade razor was used to obtain a specimen  Hemostasis achieved with aluminum chloride   Petrolatum and a sterile dressing applied  The specimen was sent for pathologic examination  The patient tolerated the procedure(s) well  Complications:  none  Assessment:     1  Unknown skin lesion     2  Screening for skin condition           Plan:   Skin lesion difficult to ascertain because of the use of Efudex advised patient that it could be a ready treated and just has not completely healed or this still could be an underlying process going on we elected to go ahead and biopsy the area  Screening for Dermatologic Disorders:  Nothing else of concern noted on cursory exam will plan follow-up again in April as previously scheduled      Connor Strong MD  11/18/2019,9:15 AM    Portions of the record may have been created with voice recognition software   Occasional wrong word or "sound a like" substitutions may have occurred due to the inherent limitations of voice recognition software   Read the chart carefully and recognize, using context, where substitutions have occurred

## 2019-11-27 ENCOUNTER — TELEPHONE (OUTPATIENT)
Dept: DERMATOLOGY | Facility: CLINIC | Age: 74
End: 2019-11-27

## 2019-11-27 NOTE — TELEPHONE ENCOUNTER
----- Message from Hyun Jc MD sent at 11/27/2019  7:48 AM EST -----  Please call the patient regarding her abnormal result   cryo

## 2019-11-29 ENCOUNTER — TELEPHONE (OUTPATIENT)
Dept: DERMATOLOGY | Facility: CLINIC | Age: 74
End: 2019-11-29

## 2019-11-29 NOTE — TELEPHONE ENCOUNTER
----- Message from Agata Vila MD sent at 11/27/2019  7:48 AM EST -----  Please call the patient regarding her abnormal result   cryo

## 2019-12-02 ENCOUNTER — TELEPHONE (OUTPATIENT)
Dept: DERMATOLOGY | Facility: CLINIC | Age: 74
End: 2019-12-02

## 2019-12-27 ENCOUNTER — OFFICE VISIT (OUTPATIENT)
Dept: DERMATOLOGY | Facility: CLINIC | Age: 74
End: 2019-12-27
Payer: MEDICARE

## 2019-12-27 DIAGNOSIS — L57.0 ACTINIC KERATOSIS: Primary | ICD-10-CM

## 2019-12-27 PROCEDURE — 17000 DESTRUCT PREMALG LESION: CPT | Performed by: DERMATOLOGY

## 2019-12-27 NOTE — PROGRESS NOTES
[address] [facility] DERMATOLOGY  [address] [place] [address]  [phone]  [phone]     MRN: [id number] : 1945  Encounter: [id number]  Patient Information: Panda Payton    Subjective:     65-year-old female presents for treatment of previously biopsied actinic keratosis on the chest wall     Objective: There were no vitals taken for this visit  Physical Exam:    General Appearance:    Alert, cooperative, no distress   Skin:   Previous site of biopsy noted  Cryotherapy Procedure Note    Pre-operative Diagnosis: actinic keratosis    Plan:  1  Instructed to keep the area dry and clean thereafter  Apply petrolatum if area gets crusty  2  Recommended that the patient use acetaminophen  as needed for pain  Locations:  Chest wall    Indications: Destruction of actinic keratosis previously biopsied    Patient informed of risks (permanent scarring, infection, light or dark discoloration, bleeding, infection, weakness, numbness and recurrence of the lesion) and benefits of the procedure and verbal informed consent obtained  The areas are treated with liquid nitrogen therapy, frozen until ice ball extended 2 mm beyond lesion, allowed to thaw, and treated again  The patient tolerated procedure well  The patient was instructed on post-op care, warned that there may be blister formation, redness and pain  Recommend OTC analgesia as needed for pain  Condition:  Stable    Complications:  none  Assessment:     1  Actinic keratosis           Plan:   Actinic Keratosis:  Patient advised lesions are precancers  These should resolve with cryosurgery if not to let us know sun block recommended  Prior to Admission medications    Medication Sig Start Date End Date Taking?  Authorizing Provider   acyclovir (ZOVIRAX) 400 MG tablet Take 400 mg by mouth daily   Yes Historical Provider, MD   Ascorbic Acid (VITAMIN C) 1000 MG tablet Take 1,000 mg by mouth daily   Yes Historical Provider, MD   aspirin 81 mg chewable tablet Chew 81 mg daily   Yes Historical Provider, MD   calcium carbonate (OS-MIGUEL) 1250 (500 Ca) MG tablet Take 1 tablet by mouth daily   Yes Historical Provider, MD   cholecalciferol (VITAMIN D3) 1,000 units tablet Take 1,000 Units by mouth daily   Yes Historical Provider, MD   esterified estrogens (MENEST) 0 3 MG tablet Take 1 tablet (0 3 mg total) by mouth daily for 90 days 12/11/18 12/27/19 Yes Marsha Horowitz PA-C   estradiol (ESTRACE) 0 1 mg/g vaginal cream 1 gm PV qhs 2-3 times per week 12/11/18  Yes Dash Love PA-C   levothyroxine 50 mcg tablet Take 50 mcg by mouth daily   Yes Historical Provider, MD   magnesium 30 MG tablet Take 30 mg by mouth 2 (two) times a day   Yes Historical Provider, MD   multivitamin (THERAGRAN) TABS Take 1 tablet by mouth daily   Yes Historical Provider, MD   Omega-3 Fatty Acids (FISH OIL) 1000 MG CPDR Take by mouth   Yes Historical Provider, MD   propranolol (INDERAL) 20 mg tablet Take 20 mg by mouth daily   Yes Historical Provider, MD   vitamin E, tocopherol, 1,000 units capsule Take 1,000 Units by mouth daily   Yes Historical Provider, MD     Allergies   Allergen Reactions    Other Patience Harps strips/adhesive tape       Christal Haines MD  12/27/2019,12:20 PM    Portions of the record may have been created with voice recognition software   Occasional wrong word or "sound a like" substitutions may have occurred due to the inherent limitations of voice recognition software   Read the chart carefully and recognize, using context, where substitutions have occurred

## 2019-12-27 NOTE — PATIENT INSTRUCTIONS
Actinic Keratosis:  Patient advised lesions are precancers  These should resolve with cryosurgery if not to let us know sun block recommended  Treatment with Cryotherapy    The doctor has treated your skin with nitrogen, which is 320 degrees Fahrenheit below zero  He has given the treated area "frostbite "    Stinging should subside within a few hours  You can take Tylenol for pain, if needed  Over the next few days, it is normal if the area becomes reddened, a blood blister, or swollen with fluid  If the lesion treated was near the eye - you could get a swollen eye over the next few days  Do not panic! This is all temporary, and will resolve with time  There is no special treatment - just keep the area clean  Makeup and BandAids can be used, if preferred  When the area starts to dry up and peel off, using Vaseline can help healing  It usually takes up to a month for it to heal   Some lesions are recurrent and may require repeat treatments  If a lesion has not healed in one month, please don't hesitate to contact us  If you have any further questions that are not answered here, please call us  [phone]    Thank you for allowing us to care for you

## 2020-01-06 ENCOUNTER — OFFICE VISIT (OUTPATIENT)
Dept: OBGYN CLINIC | Facility: CLINIC | Age: 75
End: 2020-01-06
Payer: MEDICARE

## 2020-01-06 VITALS
DIASTOLIC BLOOD PRESSURE: 76 MMHG | SYSTOLIC BLOOD PRESSURE: 132 MMHG | BODY MASS INDEX: 28.66 KG/M2 | WEIGHT: 146 LBS | HEIGHT: 60 IN

## 2020-01-06 DIAGNOSIS — Z12.31 ENCOUNTER FOR SCREENING MAMMOGRAM FOR MALIGNANT NEOPLASM OF BREAST: ICD-10-CM

## 2020-01-06 DIAGNOSIS — Z78.0 POSTMENOPAUSAL: Primary | ICD-10-CM

## 2020-01-06 DIAGNOSIS — Z01.419 ENCOUNTER FOR GYNECOLOGICAL EXAMINATION (GENERAL) (ROUTINE) WITHOUT ABNORMAL FINDINGS: ICD-10-CM

## 2020-01-06 DIAGNOSIS — L90.0 LICHEN SCLEROSUS ET ATROPHICUS: ICD-10-CM

## 2020-01-06 DIAGNOSIS — Z13.820 SCREENING FOR OSTEOPOROSIS: ICD-10-CM

## 2020-01-06 PROCEDURE — 99213 OFFICE O/P EST LOW 20 MIN: CPT | Performed by: OBSTETRICS & GYNECOLOGY

## 2020-01-06 RX ORDER — CLOBETASOL PROPIONATE 0.5 MG/G
CREAM TOPICAL 2 TIMES DAILY
Qty: 30 G | Refills: 0 | Status: SHIPPED | OUTPATIENT
Start: 2020-01-06

## 2020-01-06 NOTE — PROGRESS NOTES
Assessment/Plan:    Postmenopausal  Surgical menopause for over 20 years  Has been on ERT since TRI-BSO  At this time, she would liek to stay on the esterified estrogen (Menest) at her current dose  0 3 mg  This is a very low dose  We did discuss weaning - try to take the medication alternate days for 3-6 months  If no hot flashes then can wean further to every third day for 3-6 months  Again if no hot flashes can discontinue  If her symptoms return, we can continue the medication  Risks reviewed are risk of breast cancer, stroke, DVT/PE as concerns for continued ERT  If she no longer is having symptoms, she may not need the medication at all and stopping would the be preferred  However, would recommend continuing estrace cream for vulva due to her h/o lichen sclerosis  Lichen sclerosus et atrophicus  At this time, symptoms controlled with estrace and steroid cream - which she has had same tube for over 5 years, needs a refill       Diagnoses and all orders for this visit:    Postmenopausal  -     esterified estrogens (MENEST) 0 3 MG tablet; Take 1 tablet (0 3 mg total) by mouth daily    Encounter for gynecological examination (general) (routine) without abnormal findings    Encounter for screening mammogram for malignant neoplasm of breast  -     Mammo screening bilateral w 3d & cad; Future    Screening for osteoporosis  -     DXA bone density spine hip and pelvis; Future    Lichen sclerosus et atrophicus  -     clobetasol (TEMOVATE) 0 05 % cream; Apply topically 2 (two) times a day          Subjective:      Patient ID: Maggie Salcido is a 69 y o  female  Here today for her consult about continuing hormone therapy  Current complaints      [address] [facility] 1999  Last Mammo 10/25/19  Last Colonoscopy 11/15/2017  Last Dexa 10/1/18    Smoking Status Former   Drinking Status Never  Recreational Drug Use -Never  Exercise Routine yes      69year old [de-identified] with h/o supracervical hysterectomy with BSO over 20 years ago who presents to discuss continued use of ERT and lichen sclerosis  Within the last year her  passed away from pulmonary fiborisis, he was a physiatrist   They were living between [facility] and [place]  Since his death, has been living in [place] with plans to move back into the city when she sells her house  Her mother (80) lives in [place] - just returned from a visit to see her  Overall in good health  Was started on ERT after her hysterectomy and over time has  Weaned down her dosage and has been on this dose for several years  Prefers the esterified estrogens  Will not use premarin products for treatment  At this time , no hot flashes  Overall feels well  Aside from grief, adjusting to life without her , moving, care for her mother  H/o LIchen sclerosus - many years ago after her own research, started to use a steroid prescribed by her  and this worked to clear up her itching and burning  Would like  Refill/similar product  The following portions of the patient's history were reviewed and updated as appropriate:   She  has a past medical history of Disease of thyroid gland, Fibroids, Migraine, and Migraines  She   Patient Active Problem List    Diagnosis Date Noted    Postmenopausal 80/57/8278    Lichen sclerosus et atrophicus 01/08/2020    Encounter for gynecological examination (general) (routine) without abnormal findings 12/11/2018    Atopic neurodermatitis 02/19/2018     She  has a past surgical history that includes Hysterectomy; Tonsillectomy; Tibial plateau hardware removal (Left); Ankle fracture surgery (Left); pr colonoscopy flx dx w/collj spec when pfrmd (N/A, 11/15/2017); and Skin lesion excision (01/2020)  Her family history includes Hyperlipidemia in her mother; Hypertension in her mother; Lymphoma in her mother  She  reports that she quit smoking about 20 years ago   She has never used smokeless tobacco  She reports that she drinks alcohol  She reports that she does not use drugs  Current Outpatient Medications   Medication Sig Dispense Refill    acyclovir (ZOVIRAX) 400 MG tablet Take 400 mg by mouth daily      Ascorbic Acid (VITAMIN C) 1000 MG tablet Take 1,000 mg by mouth daily      aspirin 81 mg chewable tablet Chew 81 mg daily      calcium carbonate (OS-MIGUEL) 1250 (500 Ca) MG tablet Take 1 tablet by mouth daily      levothyroxine 50 mcg tablet Take 50 mcg by mouth daily      magnesium 30 MG tablet Take 30 mg by mouth 2 (two) times a day      multivitamin (THERAGRAN) TABS Take 1 tablet by mouth daily      Omega-3 Fatty Acids (FISH OIL) 1000 MG CPDR Take by mouth      propranolol (INDERAL) 20 mg tablet Take 20 mg by mouth daily      cholecalciferol (VITAMIN D3) 1,000 units tablet Take 1,000 Units by mouth daily      clobetasol (TEMOVATE) 0 05 % cream Apply topically 2 (two) times a day 30 g 0    esterified estrogens (MENEST) 0 3 MG tablet Take 1 tablet (0 3 mg total) by mouth daily 90 tablet 3    estradiol (ESTRACE) 0 1 mg/g vaginal cream 1 gm PV qhs 2-3 times per week (Patient not taking: Reported on 1/6/2020) 42 5 g 3    vitamin E, tocopherol, 1,000 units capsule Take 1,000 Units by mouth daily       No current facility-administered medications for this visit        Current Outpatient Medications on File Prior to Visit   Medication Sig    acyclovir (ZOVIRAX) 400 MG tablet Take 400 mg by mouth daily    Ascorbic Acid (VITAMIN C) 1000 MG tablet Take 1,000 mg by mouth daily    aspirin 81 mg chewable tablet Chew 81 mg daily    calcium carbonate (OS-MIGUEL) 1250 (500 Ca) MG tablet Take 1 tablet by mouth daily    levothyroxine 50 mcg tablet Take 50 mcg by mouth daily    magnesium 30 MG tablet Take 30 mg by mouth 2 (two) times a day    multivitamin (THERAGRAN) TABS Take 1 tablet by mouth daily    Omega-3 Fatty Acids (FISH OIL) 1000 MG CPDR Take by mouth    propranolol (INDERAL) 20 mg tablet Take 20 mg by mouth daily  cholecalciferol (VITAMIN D3) 1,000 units tablet Take 1,000 Units by mouth daily    estradiol (ESTRACE) 0 1 mg/g vaginal cream 1 gm PV qhs 2-3 times per week (Patient not taking: Reported on 1/6/2020)    vitamin E, tocopherol, 1,000 units capsule Take 1,000 Units by mouth daily     No current facility-administered medications on file prior to visit  She is allergic to other       Review of Systems   Constitutional: Negative for activity change, appetite change, chills, fatigue and fever  HENT: Negative for rhinorrhea, sneezing and sore throat  Eyes: Negative for visual disturbance  Respiratory: Negative for cough, shortness of breath and wheezing  Cardiovascular: Negative for chest pain, palpitations and leg swelling  Gastrointestinal: Negative for abdominal distention, constipation, diarrhea, nausea and vomiting  Genitourinary: Negative for difficulty urinating, dysuria, flank pain, frequency, genital sores, hematuria, menstrual problem, pelvic pain, urgency, vaginal bleeding, vaginal discharge and vaginal pain  Neurological: Negative for syncope and light-headedness           Objective:      /76 (BP Location: Left arm, Patient Position: Sitting, Cuff Size: Standard)   Ht 5' (1 524 m)   Wt 66 2 kg (146 lb)   BMI 28 51 kg/m²          Physical Exam

## 2020-01-08 PROBLEM — L82.0 INFLAMED SEBORRHEIC KERATOSIS: Status: RESOLVED | Noted: 2018-02-19 | Resolved: 2020-01-08

## 2020-01-08 PROBLEM — Z78.0 POSTMENOPAUSAL: Status: ACTIVE | Noted: 2020-01-08

## 2020-01-08 PROBLEM — Z13.89 SCREENING FOR SKIN CONDITION: Status: RESOLVED | Noted: 2018-02-19 | Resolved: 2020-01-08

## 2020-01-08 PROBLEM — N95.1 MENOPAUSAL SYMPTOMS: Status: RESOLVED | Noted: 2017-11-13 | Resolved: 2020-01-08

## 2020-01-08 PROBLEM — L82.1 SEBORRHEIC KERATOSIS: Status: RESOLVED | Noted: 2018-02-19 | Resolved: 2020-01-08

## 2020-01-08 PROBLEM — L90.0 LICHEN SCLEROSUS ET ATROPHICUS: Status: ACTIVE | Noted: 2020-01-08

## 2020-01-08 PROBLEM — N95.1 MENOPAUSAL SYMPTOMS: Status: ACTIVE | Noted: 2017-11-13

## 2020-01-08 NOTE — PATIENT INSTRUCTIONS
Hormone Replacement Therapy in Women   WHAT YOU NEED TO KNOW:   What is hormone replacement therapy? Hormone replacement therapy (HRT) is medicine to replace your low hormone levels  HRT is most common for women who are going through menopause  HRT contains estrogen and sometimes progestin  What are hormones and how do they work? Hormones are chemicals that your body makes to control certain body functions  The main female hormones are estrogen and progesterone  These are made by your ovaries  These hormones are an important part of your reproductive system  What are the signs and symptoms of low hormone levels? · Hot flashes    · Sleep loss    · Depression, nervousness, or tiredness    · Trouble staying focused    · Vaginal dryness  Why might I have low hormone levels? · Menopause  is the most common reason for low hormone levels  The average age when a woman's monthly period stops is 51 years  · Removal of your ovaries  Menopause symptoms start right away after ovaries are removed  This happens because there is no more estrogen in your body  · Excessive exercise or weight loss  can cause your estrogen level to decrease  Your monthly periods may also stop  Who should not take HRT? You should not take HRT if you have any of the following conditions:  · Breast cancer    · Cancer in the lining of your uterus or cancer of your ovaries    · Heart disease    · High blood pressure    · History of blood clots    · History of smoking, or current tobacco use    · Liver disease    · Unexplained vaginal bleeding  What else should I know about HRT? HRT helps prevent osteoporosis, which decreases your risk for bone fractures  HRT also protects you from colorectal cancer  HRT may increase your risk for breast cancer, blood clots, heart disease, and stroke  How often should I follow up with my healthcare provider? You will need to return to see your healthcare provider at least once a year   You may need tests, such a pap smear or mammogram  Your healthcare provider will ask how you are responding to HRT, and change your dose if needed  When should I contact my healthcare provider? · You have nausea or are vomiting  · You are depressed  · You have abdominal pain or cramping  · You have swollen or tender breasts  · You have more vaginal bleeding than expected  · You have sudden weight gain or loss  · You have questions or concerns about your condition or care  When should I seek immediate care or call 911? · You are confused  · Your arm or leg feels warm, tender, and painful  It may look swollen and red  · You feel lightheaded, short of breath, and have chest pain  · You cough up blood  · You have any of the following signs of a stroke:      ¨ Numbness or drooping on one side of your face     ¨ Weakness in an arm or leg    ¨ Confusion or difficulty speaking    ¨ Dizziness, a severe headache, or vision loss  CARE AGREEMENT:   You have the right to help plan your care  Learn about your health condition and how it may be treated  Discuss treatment options with your caregivers to decide what care you want to receive  You always have the right to refuse treatment  The above information is an  only  It is not intended as medical advice for individual conditions or treatments  Talk to your doctor, nurse or pharmacist before following any medical regimen to see if it is safe and effective for you  © 2017 [employer] Information is for End User's use only and may not be sold, redistributed or otherwise used for commercial purposes  All illustrations and images included in CareNotes® are the copyrighted property of [employer]  or [employer]

## 2020-01-08 NOTE — ASSESSMENT & PLAN NOTE
Surgical menopause for over 20 years  Has been on ERT since TRI-BSO  At this time, she would liek to stay on the esterified estrogen (Menest) at her current dose  0 3 mg  This is a very low dose  We did discuss weaning - try to take the medication alternate days for 3-6 months  If no hot flashes then can wean further to every third day for 3-6 months  Again if no hot flashes can discontinue  If her symptoms return, we can continue the medication  Risks reviewed are risk of breast cancer, stroke, DVT/PE as concerns for continued ERT  If she no longer is having symptoms, she may not need the medication at all and stopping would the be preferred  However, would recommend continuing estrace cream for vulva due to her h/o lichen sclerosis

## 2020-01-08 NOTE — ASSESSMENT & PLAN NOTE
At this time, symptoms controlled with estrace and steroid cream - which she has had same tube for over 5 years, needs a refill

## 2020-03-11 ENCOUNTER — OFFICE VISIT (OUTPATIENT)
Dept: DERMATOLOGY | Facility: CLINIC | Age: 75
End: 2020-03-11
Payer: MEDICARE

## 2020-03-11 DIAGNOSIS — L82.0 INFLAMED SEBORRHEIC KERATOSIS: Primary | ICD-10-CM

## 2020-03-11 DIAGNOSIS — L82.1 SEBORRHEIC KERATOSIS: ICD-10-CM

## 2020-03-11 DIAGNOSIS — Z13.89 SCREENING FOR SKIN CONDITION: ICD-10-CM

## 2020-03-11 PROCEDURE — 99213 OFFICE O/P EST LOW 20 MIN: CPT | Performed by: DERMATOLOGY

## 2020-03-11 PROCEDURE — 17110 DESTRUCTION B9 LES UP TO 14: CPT | Performed by: DERMATOLOGY

## 2020-03-11 NOTE — PATIENT INSTRUCTIONS
Inflamed keratosis lesion treated because the patient concern and irritation  Seborrheic Keratosis  Patient reasurred these are normal growths we acquire with age no treatment needed  Screening for Dermatologic Disorders: Nothing else of concern noted on complete exam follow up in 1 year   No real a active eczema at this time follow-up as needed  Treatment with Cryotherapy    The doctor has treated your skin with nitrogen, which is 320 degrees Fahrenheit below zero  He has given the treated area "frostbite "    Stinging should subside within a few hours  You can take Tylenol for pain, if needed  Over the next few days, it is normal if the area becomes reddened, a blood blister, or swollen with fluid  If the lesion treated was near the eye - you could get a swollen eye over the next few days  Do not panic! This is all temporary, and will resolve with time  There is no special treatment - just keep the area clean  Makeup and BandAids can be used, if preferred  When the area starts to dry up and peel off, using Vaseline can help healing  It usually takes up to a month for it to heal   Some lesions are recurrent and may require repeat treatments  If a lesion has not healed in one month, please don't hesitate to contact us  If you have any further questions that are not answered here, please call us  [phone]    Thank you for allowing us to care for you

## 2020-03-11 NOTE — PROGRESS NOTES
[address]  [address] [facility] [address]  [phone]  [phone]     MRN: [id number] : 1945  Encounter: [id number]  Patient Information: Vianey Risk  Chief complaint:  Irritated growths and overall checkup    History of present illness:  72-year-old female presents for follow-up for eczema this seems to be under control at present with the treat treatment with a topical steroid seems to have improved somewhat with changes hormonal therapy also concerned regarding irritated growths under her breast and near her bra that get irritated  Patient with previous actinic keratosis recently treated on the left chest  Past Medical History:   Diagnosis Date    Disease of thyroid gland     Fibroids     Migraine     Migraines      Past Surgical History:   Procedure Laterality Date    ANKLE FRACTURE SURGERY Left     HYSTERECTOMY      TN COLONOSCOPY FLX DX W/COLLJ SPEC WHEN PFRMD N/A 11/15/2017    Procedure: COLONOSCOPY;  Surgeon: Mitchell Franklin MD;  Location: [facility];   Service: Gastroenterology    SKIN LESION EXCISION  2020    chest    TIBIAL PLATEAU HARDWARE REMOVAL Left     TONSILLECTOMY       Social History   Social History     Substance and Sexual Activity   Alcohol Use Yes    Frequency: Monthly or less    Drinks per session: 1 or 2    Binge frequency: Never     Social History     Substance and Sexual Activity   Drug Use Never     Social History     Tobacco Use   Smoking Status Former Smoker    Packs/day: 1 00    Years: 10 00    Pack years: 10 00    Types: Cigarettes    Last attempt to quit: 2000    Years since quittin 2   Smokeless Tobacco Never Used     Family History   Problem Relation Age of Onset    Lymphoma Mother     Hypertension Mother     Hyperlipidemia Mother      Meds/Allergies   Allergies   Allergen Reactions    Other Hives     Steri strips/adhesive tape       Meds:  Prior to Admission medications    Medication Sig Start Date End Date Taking?  Authorizing Provider   acyclovir (ZOVIRAX) 400 MG tablet Take 400 mg by mouth daily   Yes Historical Provider, MD   Ascorbic Acid (VITAMIN C) 1000 MG tablet Take 1,000 mg by mouth daily   Yes Historical Provider, MD   aspirin 81 mg chewable tablet Chew 81 mg daily   Yes Historical Provider, MD   calcium carbonate (OS-MIGUEL) 1250 (500 Ca) MG tablet Take 1 tablet by mouth daily   Yes Historical Provider, MD   cholecalciferol (VITAMIN D3) 1,000 units tablet Take 1,000 Units by mouth daily   Yes Historical Provider, MD   clobetasol (TEMOVATE) 0 05 % cream Apply topically 2 (two) times a day 1/6/20  Yes Avery Toledo MD   esterified estrogens (MENEST) 0 3 MG tablet Take 1 tablet (0 3 mg total) by mouth daily 1/6/20 4/5/20 Yes Avery Toledo MD   estradiol (ESTRACE) 0 1 mg/g vaginal cream 1 gm PV qhs 2-3 times per week 12/11/18  Yes Martin Luque PA-C   levothyroxine 50 mcg tablet Take 50 mcg by mouth daily   Yes Historical Provider, MD   magnesium 30 MG tablet Take 30 mg by mouth 2 (two) times a day   Yes Historical Provider, MD   multivitamin (THERAGRAN) TABS Take 1 tablet by mouth daily   Yes Historical Provider, MD   Omega-3 Fatty Acids (FISH OIL) 1000 MG CPDR Take by mouth   Yes Historical Provider, MD   propranolol (INDERAL) 20 mg tablet Take 20 mg by mouth daily   Yes Historical Provider, MD   vitamin E, tocopherol, 1,000 units capsule Take 1,000 Units by mouth daily   Yes Historical Provider, MD       Subjective:     Review of Systems:    General: negative for - chills, fatigue, fever,  weight gain or weight loss  Psychological: negative for - anxiety, behavioral disorder, concentration difficulties, decreased libido, depression, irritability, memory difficulties, mood swings, sleep disturbances or suicidal ideation  ENT: negative for - hearing difficulties , nasal congestion, nasal discharge, oral lesions, sinus pain, sneezing, sore throat  Allergy and Immunology: negative for - hives, insect bite sensitivity,  Hematological and Lymphatic: negative for - bleeding problems, blood clots,bruising, swollen lymph nodes  Endocrine: negative for - hair pattern changes, hot flashes, malaise/lethargy, mood swings, palpitations, polydipsia/polyuria, skin changes, temperature intolerance or unexpected weight change  Respiratory: negative for - cough, hemoptysis, orthopnea, shortness of breath, or wheezing  Cardiovascular: negative for - chest pain, dyspnea on exertion, edema,  Gastrointestinal: negative for - abdominal pain, nausea/vomiting  Genito-Urinary: negative for - dysuria, incontinence, irregular/heavy menses or urinary frequency/urgency  Musculoskeletal: negative for - gait disturbance, joint pain, joint stiffness, joint swelling, muscle pain, muscular weakness  Dermatological:  As in HPI  Neurological: negative for confusion, dizziness, headaches, impaired coordination/balance, memory loss, numbness/tingling, seizures, speech problems, tremors or weakness       Objective: There were no vitals taken for this visit  Physical Exam:    General Appearance:    Alert, cooperative, no distress   Head:    Normocephalic, without obvious abnormality, atraumatic           Skin:   A full skin exam was performed including scalp, head scalp, eyes, ears, nose, lips, neck, chest, axilla, abdomen, back, buttocks, bilateral upper extremities, bilateral lower extremities, hands, feet, fingers, toes, fingernails, and toenails previous site of actinic keratoses well-healed without recurrence no sign of active eczema at this time a slightly inflamed keratotic papules x3 noted other bra on the back nothing else remarkable noted exam  Cryotherapy Procedure Note    Pre-operative Diagnosis: inflamed seborrheic keratosis    Plan:  1  Instructed to keep the area dry and clean thereafter  Apply petrolatum if area gets crusty        2  Recommended that the patient use acetaminophen  as needed for pain  Locations:  Back    Indications: Destruction of irritated growths x3    Patient informed of risks (permanent scarring, infection, light or dark discoloration, bleeding, infection, weakness, numbness and recurrence of the lesion) and benefits of the procedure and verbal informed consent obtained  The areas are treated with liquid nitrogen therapy, frozen until ice ball extended 2 mm beyond lesion, allowed to thaw, and treated again  The patient tolerated procedure well  The patient was instructed on post-op care, warned that there may be blister formation, redness and pain  Recommend OTC analgesia as needed for pain  Condition:  Stable    Complications:  none  Assessment:     1  Inflamed seborrheic keratosis     2  Seborrheic keratosis     3  Screening for skin condition           Plan:   Inflamed keratosis lesion treated because the patient concern and irritation  Seborrheic Keratosis  Patient reasurred these are normal growths we acquire with age no treatment needed  Screening for Dermatologic Disorders: Nothing else of concern noted on complete exam follow up in 1 year   No real a active eczema at this time follow-up as needed    Dipesh Frazier MD  3/11/2020,11:20 AM    Portions of the record may have been created with voice recognition software   Occasional wrong word or "sound a like" substitutions may have occurred due to the inherent limitations of voice recognition software   Read the chart carefully and recognize, using context, where substitutions have occurred

## 2020-11-02 ENCOUNTER — HOSPITAL ENCOUNTER (OUTPATIENT)
Dept: MAMMOGRAPHY | Facility: CLINIC | Age: 75
Discharge: HOME | End: 2020-11-02
Payer: MEDICARE

## 2020-11-02 VITALS — BODY MASS INDEX: 28.66 KG/M2 | WEIGHT: 146 LBS | HEIGHT: 60 IN

## 2020-11-02 DIAGNOSIS — Z13.820 SCREENING FOR OSTEOPOROSIS: ICD-10-CM

## 2020-11-02 DIAGNOSIS — Z12.31 ENCOUNTER FOR SCREENING MAMMOGRAM FOR MALIGNANT NEOPLASM OF BREAST: ICD-10-CM

## 2020-11-02 PROCEDURE — 77080 DXA BONE DENSITY AXIAL: CPT

## 2020-11-02 PROCEDURE — 77067 SCR MAMMO BI INCL CAD: CPT

## 2020-11-02 PROCEDURE — 77063 BREAST TOMOSYNTHESIS BI: CPT

## 2020-11-03 ENCOUNTER — TELEPHONE (OUTPATIENT)
Dept: OBGYN CLINIC | Facility: CLINIC | Age: 75
End: 2020-11-03

## 2020-12-25 ENCOUNTER — HOSPITAL ENCOUNTER (EMERGENCY)
Facility: HOSPITAL | Age: 75
Discharge: HOME | End: 2020-12-25
Attending: EMERGENCY MEDICINE
Payer: MEDICARE

## 2020-12-25 VITALS
OXYGEN SATURATION: 97 % | DIASTOLIC BLOOD PRESSURE: 74 MMHG | TEMPERATURE: 98.3 F | HEART RATE: 74 BPM | SYSTOLIC BLOOD PRESSURE: 154 MMHG | RESPIRATION RATE: 20 BRPM

## 2020-12-25 DIAGNOSIS — S61.216A LACERATION OF RIGHT LITTLE FINGER: Primary | ICD-10-CM

## 2020-12-25 PROCEDURE — 99282 EMERGENCY DEPT VISIT SF MDM: CPT | Performed by: PHYSICIAN ASSISTANT

## 2020-12-25 PROCEDURE — 12001 RPR S/N/AX/GEN/TRNK 2.5CM/<: CPT | Performed by: PHYSICIAN ASSISTANT

## 2020-12-25 PROCEDURE — 99282 EMERGENCY DEPT VISIT SF MDM: CPT

## 2021-01-04 ENCOUNTER — ANNUAL EXAM (OUTPATIENT)
Dept: OBGYN CLINIC | Facility: CLINIC | Age: 76
End: 2021-01-04
Payer: MEDICARE

## 2021-01-04 VITALS — SYSTOLIC BLOOD PRESSURE: 128 MMHG | BODY MASS INDEX: 29.29 KG/M2 | WEIGHT: 150 LBS | DIASTOLIC BLOOD PRESSURE: 80 MMHG

## 2021-01-04 DIAGNOSIS — Z01.419 ENCOUNTER FOR GYNECOLOGICAL EXAMINATION (GENERAL) (ROUTINE) WITHOUT ABNORMAL FINDINGS: Primary | ICD-10-CM

## 2021-01-04 DIAGNOSIS — Z12.31 ENCOUNTER FOR SCREENING MAMMOGRAM FOR MALIGNANT NEOPLASM OF BREAST: ICD-10-CM

## 2021-01-04 DIAGNOSIS — L90.0 LICHEN SCLEROSUS ET ATROPHICUS: ICD-10-CM

## 2021-01-04 PROBLEM — G43.109 CLASSIC MIGRAINE WITH AURA: Status: ACTIVE | Noted: 2020-10-12

## 2021-01-04 PROBLEM — E78.1 HIGH BLOOD TRIGLYCERIDES: Status: ACTIVE | Noted: 2020-10-08

## 2021-01-04 PROBLEM — L20.81 ATOPIC NEURODERMATITIS: Status: RESOLVED | Noted: 2018-02-19 | Resolved: 2021-01-04

## 2021-01-04 PROBLEM — G47.00 INSOMNIA: Status: ACTIVE | Noted: 2020-10-12

## 2021-01-04 PROBLEM — B00.1 RECURRENT HERPES LABIALIS: Status: ACTIVE | Noted: 2020-10-12

## 2021-01-04 PROBLEM — E03.9 HYPOTHYROIDISM: Status: ACTIVE | Noted: 2020-10-08

## 2021-01-04 PROBLEM — I10 BENIGN ESSENTIAL HYPERTENSION: Status: ACTIVE | Noted: 2020-10-08

## 2021-01-04 PROBLEM — R73.01 IMPAIRED FASTING GLUCOSE: Status: ACTIVE | Noted: 2020-10-08

## 2021-01-04 PROCEDURE — G0101 CA SCREEN;PELVIC/BREAST EXAM: HCPCS | Performed by: OBSTETRICS & GYNECOLOGY

## 2021-01-04 RX ORDER — FEXOFENADINE HCL 180 MG/1
TABLET ORAL
COMMUNITY

## 2021-01-04 RX ORDER — ACETAMINOPHEN 325 MG/1
TABLET ORAL
COMMUNITY

## 2021-01-04 NOTE — ASSESSMENT & PLAN NOTE
Pap/HPV not indicated  Mammogram current  Colonoscopy current  DEXA current    HRT - has decreased dosage to alternate days  Will hold at this dose for now

## 2021-01-04 NOTE — PATIENT INSTRUCTIONS
Osteopenia   AMBULATORY CARE:   Osteopenia  is a condition that causes bone loss and low bone mineral density (BMD)  Low BMD can weaken your bones and increase your risk for fractures  Osteopenia does not cause signs or symptoms  You may not know you have it until you break a bone  Osteopenia must be managed or treated so it does not worsen and become osteoporosis  Osteoporosis is a serious condition that causes bones to become weak, brittle, and easily fractured  Treatment for osteopenia  depends on your risk for fracture  If your risk is low, you may only need to make exercise, nutrition, and other lifestyle changes to manage osteopenia  The changes can help prevent more bone mineral loss and reduce your risk for fractures  If your risk is high, you may be given medicines to help strengthen your bones, prevent bone breakdown, or increase bone formation  Manage osteopenia:   · Exercise as directed  Do weight-bearing exercises, such as brisk walking, dancing, or yoga  Weight-bearing exercises help build or maintain bone  Exercises such as swimming and bike riding are non-weight-bearing and will not build or maintain bone  Weightlifting also helps strengthen bones and build muscle  Extra muscle can help protect your bones  Your healthcare provider may recommend weightlifting 3 times per week as part of your exercise routine  · Increase calcium and vitamin D as directed  Calcium and vitamin D work together to help build bone  The body deposits calcium into the bones until about age 27  You will then need to get enough calcium and vitamin D to maintain what your bones are storing  Your healthcare provider may tell you to eat more dairy products, such as milk and cheese, for calcium  Spinach, salmon, and dried beans are also good sources of calcium  Cereal, bread, and orange juice may be fortified with vitamin D  You also get vitamin D from exposure to sunlight   Your healthcare provider may also suggest a calcium or vitamin D supplement  Do not take supplements unless directed  · Limit or do not drink alcohol as directed  Alcohol can take calcium from your bones and increase your risk for fractures  Ask your healthcare provider if it is safe for you to drink alcohol  Women should limit alcohol to 1 drink per day  Men should limit alcohol to 2 drinks per day  A drink of alcohol is 12 ounces of beer, 5 ounces of wine, or 1½ ounces of liquor  · Do not smoke  Nicotine can damage blood vessels and make it more difficult to manage your osteopenia  Smoking also affects bone density and increases your risk for bone fractures  Do not use e-cigarettes or smokeless tobacco in place of cigarettes or to help you quit  They still contain nicotine  Ask your healthcare provider for information if you currently smoke and need help quitting  Ask your healthcare provider for information if you need help quitting  · Prevent falls  Decrease your risk for falling by removing items from the floor and removing loose carpets  Turn the lights on where you will be walking  Follow up with your healthcare provider as directed:  Write down your questions so you remember to ask them during your visits  © Copyright [address] Information is for End User's use only and may not be sold, redistributed or otherwise used for commercial purposes  All illustrations and images included in CareNotes® are the copyrighted property of A D A M , Inc  or [facility]   The above information is an  only  It is not intended as medical advice for individual conditions or treatments  Talk to your doctor, nurse or pharmacist before following any medical regimen to see if it is safe and effective for you

## 2021-01-04 NOTE — PROGRESS NOTES
Assessment/Plan:    Lichen sclerosus et atrophicus  Maintainence  therapy with estrace cream once per week  Clobetasol PRN      Encounter for gynecological examination (general) (routine) without abnormal findings  Pap/HPV not indicated  Mammogram current  Colonoscopy current  DEXA current    HRT - has decreased dosage to alternate days  Will hold at this dose for now  Diagnoses and all orders for this visit:    Encounter for gynecological examination (general) (routine) without abnormal findings    Encounter for screening mammogram for malignant neoplasm of breast  -     Mammo screening bilateral w 3d & cad; Future    Lichen sclerosus et atrophicus    Other orders  -     fexofenadine (ALLEGRA) 180 MG tablet; TAKE 1 TABLET DAILY AS NEEDED FOR ALLERGIES  -     acetaminophen (Tylenol) 325 mg tablet; Tylenol TABS    Refills: 0       Active  -     Aspirin-Calcium Carbonate  MG TABS; TAKE 1 TABLET DAILY  Subjective:      Patient ID: William Triana is a 70 y o  female  Patient presents for a routine annual visit  Age of first period-Y/O    Mammogram-22 WNL  Colonoscopy-11/15/17   Dexa-20 Osteopenia    former smoker  Social drinker  Not currently sexually active  No concerns/questions for today's visit    LIves alone,  passed away several years ago - pulmonary fibrosis, he was a physiologist     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  More sedentary over the last year  Gynecologic Exam  The patient's pertinent negatives include no genital itching, genital lesions, genital odor, genital rash, pelvic pain, vaginal bleeding or vaginal discharge  The patient is experiencing no pain  Pertinent negatives include no abdominal pain, back pain, chills, dysuria, fever, hematuria, rash, sore throat or vomiting   She is postmenopausal        The following portions of the patient's history were reviewed and updated as appropriate:   She  has a past medical history of Disease of thyroid gland, Fibroids, Migraine, and Migraines  She   Patient Active Problem List    Diagnosis Date Noted    Classic migraine with aura 10/12/2020    Insomnia 10/12/2020    Recurrent herpes labialis 10/12/2020    Benign essential hypertension 10/08/2020    High blood triglycerides 10/08/2020    Hypothyroidism 10/08/2020    Impaired fasting glucose 86/62/8321    Lichen sclerosus et atrophicus 01/08/2020    Encounter for gynecological examination (general) (routine) without abnormal findings 12/11/2018     She  has a past surgical history that includes Tonsillectomy; Tibial plateau hardware removal (Left); Ankle fracture surgery (Left); pr colonoscopy flx dx w/collj spec when pfrmd (N/A, 11/15/2017); Skin lesion excision (01/2020); and Hysterectomy  Her family history includes Hyperlipidemia in her mother; Hypertension in her mother; Lung cancer in her father; Lymphoma in her mother; No Known Problems in her maternal grandfather, maternal grandmother, paternal aunt, paternal aunt, paternal aunt, paternal aunt, paternal aunt, paternal aunt, paternal aunt, paternal grandfather, and paternal grandmother; Stomach cancer in her maternal aunt  She  reports that she quit smoking about 21 years ago  Her smoking use included cigarettes  She has a 10 00 pack-year smoking history  She has never used smokeless tobacco  She reports current alcohol use  She reports that she does not use drugs  Current Outpatient Medications   Medication Sig Dispense Refill    acetaminophen (Tylenol) 325 mg tablet Tylenol TABS    Refills: 0       Active      acyclovir (ZOVIRAX) 400 MG tablet Take 400 mg by mouth daily      Ascorbic Acid (VITAMIN C) 1000 MG tablet Take 1,000 mg by mouth daily      aspirin 81 mg chewable tablet Chew 81 mg daily      Aspirin-Calcium Carbonate  MG TABS TAKE 1 TABLET DAILY        calcium carbonate (OS-MIGUEL) 1250 (500 Ca) MG tablet Take 1 tablet by mouth daily      cholecalciferol (VITAMIN D3) 1,000 units tablet Take 1,000 Units by mouth daily      clobetasol (TEMOVATE) 0 05 % cream Apply topically 2 (two) times a day 30 g 0    esterified estrogens (MENEST) 0 3 MG tablet Take 1 tablet (0 3 mg total) by mouth daily 90 tablet 3    estradiol (ESTRACE) 0 1 mg/g vaginal cream 1 gm PV qhs 2-3 times per week 42 5 g 3    fexofenadine (ALLEGRA) 180 MG tablet TAKE 1 TABLET DAILY AS NEEDED FOR ALLERGIES   levothyroxine 50 mcg tablet Take 50 mcg by mouth daily      magnesium 30 MG tablet Take 30 mg by mouth 2 (two) times a day      multivitamin (THERAGRAN) TABS Take 1 tablet by mouth daily      Omega-3 Fatty Acids (FISH OIL) 1000 MG CPDR Take by mouth      propranolol (INDERAL) 20 mg tablet Take 20 mg by mouth daily      vitamin E, tocopherol, 1,000 units capsule Take 1,000 Units by mouth daily       No current facility-administered medications for this visit  Current Outpatient Medications on File Prior to Visit   Medication Sig    acetaminophen (Tylenol) 325 mg tablet Tylenol TABS    Refills: 0       Active    acyclovir (ZOVIRAX) 400 MG tablet Take 400 mg by mouth daily    Ascorbic Acid (VITAMIN C) 1000 MG tablet Take 1,000 mg by mouth daily    aspirin 81 mg chewable tablet Chew 81 mg daily    Aspirin-Calcium Carbonate  MG TABS TAKE 1 TABLET DAILY   calcium carbonate (OS-MIGUEL) 1250 (500 Ca) MG tablet Take 1 tablet by mouth daily    cholecalciferol (VITAMIN D3) 1,000 units tablet Take 1,000 Units by mouth daily    clobetasol (TEMOVATE) 0 05 % cream Apply topically 2 (two) times a day    esterified estrogens (MENEST) 0 3 MG tablet Take 1 tablet (0 3 mg total) by mouth daily    estradiol (ESTRACE) 0 1 mg/g vaginal cream 1 gm PV qhs 2-3 times per week    fexofenadine (ALLEGRA) 180 MG tablet TAKE 1 TABLET DAILY AS NEEDED FOR ALLERGIES      levothyroxine 50 mcg tablet Take 50 mcg by mouth daily    magnesium 30 MG tablet Take 30 mg by mouth 2 (two) times a day    multivitamin (THERAGRAN) TABS Take 1 tablet by mouth daily    Omega-3 Fatty Acids (FISH OIL) 1000 MG CPDR Take by mouth    propranolol (INDERAL) 20 mg tablet Take 20 mg by mouth daily    vitamin E, tocopherol, 1,000 units capsule Take 1,000 Units by mouth daily     No current facility-administered medications on file prior to visit  She is allergic to other       Review of Systems   Constitutional: Negative for chills and fever  HENT: Negative for ear pain and sore throat  Eyes: Negative for pain and visual disturbance  Respiratory: Negative for cough and shortness of breath  Cardiovascular: Negative for chest pain and palpitations  Gastrointestinal: Negative for abdominal pain and vomiting  Genitourinary: Negative for dysuria, hematuria, pelvic pain and vaginal discharge  Musculoskeletal: Negative for arthralgias and back pain  Skin: Negative for color change and rash  Neurological: Negative for seizures and syncope  All other systems reviewed and are negative  Objective:      /80   Wt 68 kg (150 lb)   BMI 29 29 kg/m²          Physical Exam  Constitutional:       General: She is not in acute distress  Appearance: She is well-developed  She is not diaphoretic  Chest:      Breasts: Breasts are symmetrical          Right: No inverted nipple, mass, nipple discharge, skin change or tenderness  Left: No inverted nipple, mass, nipple discharge, skin change or tenderness  Abdominal:      General: Bowel sounds are normal  There is no distension  Palpations: Abdomen is soft  There is no mass  Tenderness: There is no abdominal tenderness  There is no guarding or rebound  Genitourinary:     Labia:         Right: No rash, tenderness, lesion or injury  Left: No rash, tenderness, lesion or injury  Vagina: No vaginal discharge or bleeding  Cervix: No cervical motion tenderness, discharge or friability        Uterus: Not deviated, not enlarged, not fixed and not tender  Adnexa:         Right: No mass, tenderness or fullness  Left: No mass, tenderness or fullness  Comments: Urethral meatus- no lesions, non tender  Urethra non tender  Bladder non tender  Thin, atrophic vaginal mucosa  Skin:     General: Skin is warm and dry  Coloration: Skin is not pale  Findings: No erythema or rash

## 2021-01-10 DIAGNOSIS — Z78.0 POSTMENOPAUSAL: ICD-10-CM

## 2021-01-11 RX ORDER — ESTERIFIED ESTROGENS 0.3 MG/1
TABLET, FILM COATED ORAL
Qty: 90 TABLET | Refills: 3 | Status: SHIPPED | OUTPATIENT
Start: 2021-01-11 | End: 2022-02-14

## 2021-02-10 ENCOUNTER — IMMUNIZATIONS (OUTPATIENT)
Dept: FAMILY MEDICINE CLINIC | Facility: HOSPITAL | Age: 76
End: 2021-02-10

## 2021-02-10 DIAGNOSIS — Z23 ENCOUNTER FOR IMMUNIZATION: Primary | ICD-10-CM

## 2021-02-10 PROCEDURE — 91301 SARS-COV-2 / COVID-19 MRNA VACCINE (MODERNA) 100 MCG: CPT

## 2021-02-10 PROCEDURE — 0011A SARS-COV-2 / COVID-19 MRNA VACCINE (MODERNA) 100 MCG: CPT

## 2021-03-09 ENCOUNTER — IMMUNIZATIONS (OUTPATIENT)
Dept: FAMILY MEDICINE CLINIC | Facility: HOSPITAL | Age: 76
End: 2021-03-09

## 2021-03-09 DIAGNOSIS — Z23 ENCOUNTER FOR IMMUNIZATION: Primary | ICD-10-CM

## 2021-03-09 PROCEDURE — 91301 SARS-COV-2 / COVID-19 MRNA VACCINE (MODERNA) 100 MCG: CPT

## 2021-03-09 PROCEDURE — 0012A SARS-COV-2 / COVID-19 MRNA VACCINE (MODERNA) 100 MCG: CPT

## 2021-04-27 ENCOUNTER — TRANSCRIBE ORDERS (OUTPATIENT)
Dept: ADMINISTRATIVE | Facility: HOSPITAL | Age: 76
End: 2021-04-27

## 2021-04-27 DIAGNOSIS — I67.1 CEREBRAL ANEURYSM, NONRUPTURED: Primary | ICD-10-CM

## 2021-04-27 DIAGNOSIS — G43.801 OCULAR MIGRAINE WITH STATUS MIGRAINOSUS, NOT INTRACTABLE: ICD-10-CM

## 2021-05-04 ENCOUNTER — TRANSCRIBE ORDERS (OUTPATIENT)
Dept: ADMINISTRATIVE | Facility: HOSPITAL | Age: 76
End: 2021-05-04

## 2021-05-04 ENCOUNTER — APPOINTMENT (OUTPATIENT)
Dept: LAB | Facility: CLINIC | Age: 76
End: 2021-05-04
Payer: MEDICARE

## 2021-05-04 DIAGNOSIS — Z51.81 ENCOUNTER FOR THERAPEUTIC DRUG MONITORING: ICD-10-CM

## 2021-05-04 DIAGNOSIS — Z51.81 ENCOUNTER FOR THERAPEUTIC DRUG MONITORING: Primary | ICD-10-CM

## 2021-05-04 LAB
BUN SERPL-MCNC: 20 MG/DL (ref 5–25)
CREAT SERPL-MCNC: 0.94 MG/DL (ref 0.6–1.3)
GFR SERPL CREATININE-BSD FRML MDRD: 60 ML/MIN/1.73SQ M

## 2021-05-04 PROCEDURE — 84520 ASSAY OF UREA NITROGEN: CPT

## 2021-05-04 PROCEDURE — 82565 ASSAY OF CREATININE: CPT

## 2021-05-04 PROCEDURE — 36415 COLL VENOUS BLD VENIPUNCTURE: CPT

## 2021-06-10 ENCOUNTER — HOSPITAL ENCOUNTER (OUTPATIENT)
Dept: CT IMAGING | Facility: HOSPITAL | Age: 76
Discharge: HOME | End: 2021-06-10
Attending: PSYCHIATRY & NEUROLOGY
Payer: MEDICARE

## 2021-06-10 ENCOUNTER — HOSPITAL ENCOUNTER (OUTPATIENT)
Dept: MRI IMAGING | Facility: HOSPITAL | Age: 76
Discharge: HOME | End: 2021-06-10
Attending: PSYCHIATRY & NEUROLOGY
Payer: MEDICARE

## 2021-06-10 DIAGNOSIS — G43.801 OCULAR MIGRAINE WITH STATUS MIGRAINOSUS, NOT INTRACTABLE: ICD-10-CM

## 2021-06-10 DIAGNOSIS — I67.1 CEREBRAL ANEURYSM, NONRUPTURED: ICD-10-CM

## 2021-06-10 PROCEDURE — 70496 CT ANGIOGRAPHY HEAD: CPT

## 2021-06-10 PROCEDURE — 70551 MRI BRAIN STEM W/O DYE: CPT

## 2021-06-10 RX ADMIN — IOHEXOL 85 ML: 350 INJECTION, SOLUTION INTRAVENOUS at 15:26

## 2021-07-05 ENCOUNTER — APPOINTMENT (OUTPATIENT)
Dept: LAB | Facility: CLINIC | Age: 76
End: 2021-07-05
Payer: MEDICARE

## 2021-07-05 DIAGNOSIS — E78.00 ELEVATED CHOLESTEROL: ICD-10-CM

## 2021-07-05 PROCEDURE — 36415 COLL VENOUS BLD VENIPUNCTURE: CPT

## 2021-07-05 PROCEDURE — 80061 LIPID PANEL: CPT

## 2021-07-06 LAB
CHOLEST SERPL-MCNC: 225 MG/DL (ref 50–200)
HDLC SERPL-MCNC: 55 MG/DL
LDLC SERPL CALC-MCNC: 150 MG/DL (ref 0–100)
NONHDLC SERPL-MCNC: 170 MG/DL
TRIGL SERPL-MCNC: 101 MG/DL

## 2021-08-17 ENCOUNTER — HOSPITAL ENCOUNTER (OUTPATIENT)
Dept: NON INVASIVE DIAGNOSTICS | Facility: CLINIC | Age: 76
Discharge: HOME | End: 2021-08-17
Payer: MEDICARE

## 2021-08-17 DIAGNOSIS — I63.239 CEREBROVASCULAR ACCIDENT (CVA) DUE TO STENOSIS OF CAROTID ARTERY, UNSPECIFIED BLOOD VESSEL LATERALITY (HCC): ICD-10-CM

## 2021-08-17 PROCEDURE — 93880 EXTRACRANIAL BILAT STUDY: CPT

## 2021-08-17 PROCEDURE — 93880 EXTRACRANIAL BILAT STUDY: CPT | Performed by: SURGERY

## 2022-02-07 ENCOUNTER — APPOINTMENT (OUTPATIENT)
Dept: LAB | Facility: CLINIC | Age: 77
End: 2022-02-07
Payer: MEDICARE

## 2022-02-07 DIAGNOSIS — E78.00 PURE HYPERCHOLESTEROLEMIA: ICD-10-CM

## 2022-02-07 LAB
CHOLEST SERPL-MCNC: 233 MG/DL
HDLC SERPL-MCNC: 60 MG/DL
LDLC SERPL CALC-MCNC: 150 MG/DL (ref 0–100)
NONHDLC SERPL-MCNC: 173 MG/DL
TRIGL SERPL-MCNC: 117 MG/DL

## 2022-02-07 PROCEDURE — 36415 COLL VENOUS BLD VENIPUNCTURE: CPT

## 2022-02-07 PROCEDURE — 80061 LIPID PANEL: CPT

## 2022-02-13 DIAGNOSIS — Z78.0 POSTMENOPAUSAL: ICD-10-CM

## 2022-02-14 RX ORDER — ESTERIFIED ESTROGENS 0.3 MG/1
TABLET, FILM COATED ORAL
Qty: 90 TABLET | Refills: 3 | Status: SHIPPED | OUTPATIENT
Start: 2022-02-14

## 2022-03-04 ENCOUNTER — HOSPITAL ENCOUNTER (OUTPATIENT)
Dept: VASCULAR ULTRASOUND | Facility: HOSPITAL | Age: 77
Discharge: HOME | End: 2022-03-04
Payer: MEDICARE

## 2022-03-04 DIAGNOSIS — G45.9 TIA (TRANSIENT ISCHEMIC ATTACK): ICD-10-CM

## 2022-03-04 PROCEDURE — 93880 EXTRACRANIAL BILAT STUDY: CPT

## 2022-03-07 PROCEDURE — 93880 EXTRACRANIAL BILAT STUDY: CPT | Performed by: SURGERY

## 2022-04-01 ENCOUNTER — HOSPITAL ENCOUNTER (OUTPATIENT)
Dept: MAMMOGRAPHY | Facility: CLINIC | Age: 77
Discharge: HOME | End: 2022-04-01
Payer: MEDICARE

## 2022-04-01 VITALS — HEIGHT: 60 IN | BODY MASS INDEX: 27.09 KG/M2 | WEIGHT: 138 LBS

## 2022-04-01 DIAGNOSIS — Z12.31 ENCOUNTER FOR SCREENING MAMMOGRAM FOR MALIGNANT NEOPLASM OF BREAST: ICD-10-CM

## 2022-04-01 PROCEDURE — 77067 SCR MAMMO BI INCL CAD: CPT

## 2022-04-01 PROCEDURE — 77063 BREAST TOMOSYNTHESIS BI: CPT

## 2022-04-11 ENCOUNTER — HOSPITAL ENCOUNTER (OUTPATIENT)
Dept: MAMMOGRAPHY | Facility: CLINIC | Age: 77
Discharge: HOME | End: 2022-04-11

## 2022-04-11 ENCOUNTER — HOSPITAL ENCOUNTER (OUTPATIENT)
Dept: ULTRASOUND IMAGING | Facility: CLINIC | Age: 77
Discharge: HOME | End: 2022-04-11

## 2022-04-11 DIAGNOSIS — R92.8 ABNORMAL MAMMOGRAM: ICD-10-CM

## 2022-04-27 ENCOUNTER — HOSPITAL ENCOUNTER (OUTPATIENT)
Dept: MAMMOGRAPHY | Facility: CLINIC | Age: 77
Discharge: HOME | End: 2022-04-27
Payer: MEDICARE

## 2022-04-27 VITALS — BODY MASS INDEX: 27.09 KG/M2 | WEIGHT: 138 LBS | HEIGHT: 60 IN

## 2022-04-27 PROCEDURE — G0279 TOMOSYNTHESIS, MAMMO: HCPCS

## 2022-04-27 PROCEDURE — 77066 DX MAMMO INCL CAD BI: CPT

## 2022-07-23 ENCOUNTER — HOSPITAL ENCOUNTER (OUTPATIENT)
Dept: MRI IMAGING | Facility: HOSPITAL | Age: 77
Discharge: HOME | End: 2022-07-23
Attending: PSYCHIATRY & NEUROLOGY
Payer: MEDICARE

## 2022-07-23 DIAGNOSIS — Z86.73 PERSONAL HISTORY OF TRANSIENT CEREBRAL ISCHEMIA: ICD-10-CM

## 2022-07-23 PROCEDURE — 70551 MRI BRAIN STEM W/O DYE: CPT

## 2022-10-25 ENCOUNTER — APPOINTMENT (OUTPATIENT)
Dept: LAB | Facility: CLINIC | Age: 77
End: 2022-10-25
Payer: MEDICARE

## 2022-10-25 DIAGNOSIS — E78.1: ICD-10-CM

## 2022-10-25 DIAGNOSIS — Z11.59 SPECIAL SCREENING EXAMINATION FOR VIRAL DISEASE: ICD-10-CM

## 2022-10-25 DIAGNOSIS — R73.01 IMPAIRED FASTING GLUCOSE: ICD-10-CM

## 2022-10-25 DIAGNOSIS — E03.9 HYPOTHYROIDISM, ADULT: ICD-10-CM

## 2022-10-25 LAB
ALBUMIN SERPL BCP-MCNC: 3.4 G/DL (ref 3.5–5)
ALP SERPL-CCNC: 72 U/L (ref 46–116)
ALT SERPL W P-5'-P-CCNC: 32 U/L (ref 12–78)
ANION GAP SERPL CALCULATED.3IONS-SCNC: 3 MMOL/L (ref 4–13)
AST SERPL W P-5'-P-CCNC: 20 U/L (ref 5–45)
BASOPHILS # BLD AUTO: 0.03 THOUSANDS/ÂΜL (ref 0–0.1)
BASOPHILS NFR BLD AUTO: 1 % (ref 0–1)
BILIRUB SERPL-MCNC: 0.38 MG/DL (ref 0.2–1)
BUN SERPL-MCNC: 18 MG/DL (ref 5–25)
CALCIUM ALBUM COR SERPL-MCNC: 10.1 MG/DL (ref 8.3–10.1)
CALCIUM SERPL-MCNC: 9.6 MG/DL (ref 8.3–10.1)
CHLORIDE SERPL-SCNC: 104 MMOL/L (ref 96–108)
CHOLEST SERPL-MCNC: 218 MG/DL
CO2 SERPL-SCNC: 27 MMOL/L (ref 21–32)
CREAT SERPL-MCNC: 0.98 MG/DL (ref 0.6–1.3)
EOSINOPHIL # BLD AUTO: 0.23 THOUSAND/ÂΜL (ref 0–0.61)
EOSINOPHIL NFR BLD AUTO: 5 % (ref 0–6)
ERYTHROCYTE [DISTWIDTH] IN BLOOD BY AUTOMATED COUNT: 14.9 % (ref 11.6–15.1)
EST. AVERAGE GLUCOSE BLD GHB EST-MCNC: 123 MG/DL
GFR SERPL CREATININE-BSD FRML MDRD: 57 ML/MIN/1.73SQ M
GLUCOSE P FAST SERPL-MCNC: 92 MG/DL (ref 65–99)
HBA1C MFR BLD: 5.9 %
HCT VFR BLD AUTO: 44.1 % (ref 34.8–46.1)
HCV AB SER QL: NORMAL
HDLC SERPL-MCNC: 60 MG/DL
HGB BLD-MCNC: 14.4 G/DL (ref 11.5–15.4)
IMM GRANULOCYTES # BLD AUTO: 0.01 THOUSAND/UL (ref 0–0.2)
IMM GRANULOCYTES NFR BLD AUTO: 0 % (ref 0–2)
LDLC SERPL CALC-MCNC: 134 MG/DL (ref 0–100)
LYMPHOCYTES # BLD AUTO: 1.16 THOUSANDS/ÂΜL (ref 0.6–4.47)
LYMPHOCYTES NFR BLD AUTO: 26 % (ref 14–44)
MCH RBC QN AUTO: 30.1 PG (ref 26.8–34.3)
MCHC RBC AUTO-ENTMCNC: 32.7 G/DL (ref 31.4–37.4)
MCV RBC AUTO: 92 FL (ref 82–98)
MONOCYTES # BLD AUTO: 0.76 THOUSAND/ÂΜL (ref 0.17–1.22)
MONOCYTES NFR BLD AUTO: 17 % (ref 4–12)
NEUTROPHILS # BLD AUTO: 2.28 THOUSANDS/ÂΜL (ref 1.85–7.62)
NEUTS SEG NFR BLD AUTO: 51 % (ref 43–75)
NONHDLC SERPL-MCNC: 158 MG/DL
NRBC BLD AUTO-RTO: 0 /100 WBCS
PLATELET # BLD AUTO: 251 THOUSANDS/UL (ref 149–390)
PMV BLD AUTO: 10.3 FL (ref 8.9–12.7)
POTASSIUM SERPL-SCNC: 4.4 MMOL/L (ref 3.5–5.3)
PROT SERPL-MCNC: 7.2 G/DL (ref 6.4–8.4)
RBC # BLD AUTO: 4.78 MILLION/UL (ref 3.81–5.12)
SODIUM SERPL-SCNC: 134 MMOL/L (ref 135–147)
TRIGL SERPL-MCNC: 122 MG/DL
TSH SERPL DL<=0.05 MIU/L-ACNC: 1.12 UIU/ML (ref 0.45–4.5)
WBC # BLD AUTO: 4.47 THOUSAND/UL (ref 4.31–10.16)

## 2022-10-25 PROCEDURE — 80053 COMPREHEN METABOLIC PANEL: CPT

## 2022-10-25 PROCEDURE — 86769 SARS-COV-2 COVID-19 ANTIBODY: CPT

## 2022-10-25 PROCEDURE — 85025 COMPLETE CBC W/AUTO DIFF WBC: CPT

## 2022-10-25 PROCEDURE — 84443 ASSAY THYROID STIM HORMONE: CPT

## 2022-10-25 PROCEDURE — 86803 HEPATITIS C AB TEST: CPT

## 2022-10-25 PROCEDURE — 36415 COLL VENOUS BLD VENIPUNCTURE: CPT

## 2022-10-25 PROCEDURE — 80061 LIPID PANEL: CPT

## 2022-10-25 PROCEDURE — 83036 HEMOGLOBIN GLYCOSYLATED A1C: CPT

## 2022-10-26 LAB
SARS-COV-2 IGG SERPL QL IA: REACTIVE
SARS-COV-2 IGG+IGM SERPL QL IA: REACTIVE

## 2023-01-20 ENCOUNTER — TELEPHONE (OUTPATIENT)
Dept: OBGYN CLINIC | Facility: CLINIC | Age: 78
End: 2023-01-20

## 2023-01-20 DIAGNOSIS — Z12.31 VISIT FOR SCREENING MAMMOGRAM: Primary | ICD-10-CM

## 2023-01-20 NOTE — TELEPHONE ENCOUNTER
----- Message from Veronica Dye sent at 1/19/2023  4:37 PM EST -----  Regarding: Question about future visit  Contact: [phone]  Dear Dr Marcello Baltazar,  It's been a few years since my last visit and now I see that I'm being denied making an appointment  How can this be when I still need refills on estrogen-type cream and I'm still on a low-dose menest, plus routine mammograms need to be redone often (as in last year) for suspicious shadowing  I've asked my GP to send me an RX for a mammogram, but it's not due until the end of April and takes a long time to schedule  I'm also confused as between medicare and supplemental I pay $600 a month for health insurance  I thought this was less invasive than calling your office, but I have friends being diagnosed with breast cancer who are my age (and had no lumps they could feel) and this is a real concern  Thank you    Panda (BRETT) Ezequiel Bonilla

## 2023-05-02 NOTE — ASSESSMENT & PLAN NOTE
Pap/HPV not indicated  Mammogram ordered  Colonoscopy current  DEXA ordered    Encourage healthy diet, exercise, Calcium 1200mg per day and at least 800 iu Vitamin D daily

## 2023-05-03 ENCOUNTER — ANNUAL EXAM (OUTPATIENT)
Dept: OBGYN CLINIC | Facility: CLINIC | Age: 78
End: 2023-05-03

## 2023-05-03 VITALS
WEIGHT: 140 LBS | SYSTOLIC BLOOD PRESSURE: 114 MMHG | DIASTOLIC BLOOD PRESSURE: 78 MMHG | BODY MASS INDEX: 28.22 KG/M2 | HEIGHT: 59 IN

## 2023-05-03 DIAGNOSIS — Z01.419 ENCOUNTER FOR GYNECOLOGICAL EXAMINATION (GENERAL) (ROUTINE) WITHOUT ABNORMAL FINDINGS: Primary | ICD-10-CM

## 2023-05-03 DIAGNOSIS — Z78.0 POSTMENOPAUSAL: ICD-10-CM

## 2023-05-03 PROBLEM — E78.5 DYSLIPIDEMIA: Status: ACTIVE | Noted: 2021-07-06

## 2023-05-03 PROBLEM — B00.1 RECURRENT HERPES LABIALIS: Status: RESOLVED | Noted: 2020-10-12 | Resolved: 2023-05-03

## 2023-05-03 PROBLEM — M25.569 KNEE PAIN: Status: ACTIVE | Noted: 2021-10-15

## 2023-05-03 PROBLEM — R73.01 IMPAIRED FASTING GLUCOSE: Status: RESOLVED | Noted: 2020-10-08 | Resolved: 2023-05-03

## 2023-05-03 PROBLEM — G47.00 INSOMNIA: Status: RESOLVED | Noted: 2020-10-12 | Resolved: 2023-05-03

## 2023-05-03 PROBLEM — I63.10 CEREBROVASCULAR ACCIDENT (CVA) DUE TO EMBOLISM OF PRECEREBRAL ARTERY (HCC): Status: ACTIVE | Noted: 2021-07-06

## 2023-05-03 PROBLEM — L90.0 LICHEN SCLEROSUS ET ATROPHICUS: Status: RESOLVED | Noted: 2020-01-08 | Resolved: 2023-05-03

## 2023-05-03 RX ORDER — ESTERIFIED ESTROGENS 0.3 MG/1
TABLET, FILM COATED ORAL
Qty: 90 TABLET | Refills: 3 | Status: SHIPPED | OUTPATIENT
Start: 2023-05-03

## 2023-05-03 RX ORDER — OMEGA-3-ACID ETHYL ESTERS 1 G/1
4 CAPSULE, LIQUID FILLED ORAL DAILY
COMMUNITY
Start: 2023-01-31

## 2023-05-03 NOTE — PROGRESS NOTES
Assessment/Plan:    Encounter for gynecological examination (general) (routine) without abnormal findings  Pap/HPV not indicated  Mammogram ordered  Colonoscopy current  DEXA ordered    Encourage healthy diet, exercise, Calcium 1200mg per day and at least 800 iu Vitamin D daily  Diagnoses and all orders for this visit:    Encounter for gynecological examination (general) (routine) without abnormal findings    Other orders  -     omega-3-acid ethyl esters (LOVAZA) 1 g capsule; Take 4 g by mouth daily          Subjective:      Patient ID: Latosha Neff is a 78 y o  female  pap no longer indicated  mammo 4/27/22 normal  Order already in system  Reminded pt to schedule  colon 2/13/23 recall 1 year  Dr Gonslao Correa 4/28/23 osteopenia   Dad colon cancer  former smoker     Travels to [place]  Mother 80 lives there, under treatment for lymphoma    Taking estrogen alternate days, doing well  Rare use of vaginal creams    Gynecologic Exam  She reports no genital itching, genital lesions, genital odor, genital rash, pelvic pain, vaginal bleeding or vaginal discharge  Pertinent negatives include no chills, constipation, diarrhea, fever, nausea, sore throat or vomiting  The following portions of the patient's history were reviewed and updated as appropriate:   She  has a past medical history of Abnormal Pap smear of cervix, Disease of thyroid gland, Fibroid (1998), Fibroids, Hyperthyroidism (Don't remember), Migraine, Migraines, and Recurrent herpes labialis (10/12/2020)    She   Patient Active Problem List    Diagnosis Date Noted    Knee pain 10/15/2021    Cerebrovascular 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 ([facility] [address] ) 07/06/2021    Dyslipidemia 07/06/2021    Benign essential hypertension 10/08/2020    High blood triglycerides 10/08/2020    Hypothyroidism 10/08/2020    Encounter for gynecological examination (general) (routine) without abnormal findings 12/11/2018     She  has a past surgical history that includes Tonsillectomy; Tibial plateau hardware removal (Left); Ankle fracture surgery (Left); pr colonoscopy flx dx w/collj spec when pfrmd (N/A, 11/15/2017); Skin lesion excision (01/2020); Hysterectomy; and pr conization cervix w/wo d&c rpr knife/laser (1979)  Her family history includes Cancer in her mother; Colon cancer in her father; Hyperlipidemia in her mother; Hypertension in her mother; Lung cancer in her father; Lymphoma in her mother; No Known Problems in her maternal grandfather, maternal grandmother, paternal aunt, paternal aunt, paternal aunt, paternal aunt, paternal aunt, paternal aunt, paternal aunt, paternal grandfather, and paternal grandmother; Stomach cancer in her maternal aunt  She  reports that she quit smoking about 23 years ago  Her smoking use included cigarettes  She has a 10 00 pack-year smoking history  She has never used smokeless tobacco  She reports current alcohol use  She reports that she does not use drugs  Current Outpatient Medications   Medication Sig Dispense Refill    acyclovir (ZOVIRAX) 400 MG tablet Take 400 mg by mouth daily      Ascorbic Acid (VITAMIN C) 1000 MG tablet Take 1,000 mg by mouth daily      aspirin 81 mg chewable tablet Chew 81 mg daily      Aspirin-Calcium Carbonate  MG TABS TAKE 1 TABLET DAILY   calcium carbonate (OS-MIGUEL) 1250 (500 Ca) MG tablet Take 1 tablet by mouth daily      clobetasol (TEMOVATE) 0 05 % cream Apply topically 2 (two) times a day 30 g 0    estradiol (ESTRACE) 0 1 mg/g vaginal cream 1 gm PV qhs 2-3 times per week 42 5 g 3    fexofenadine (ALLEGRA) 180 MG tablet TAKE 1 TABLET DAILY AS NEEDED FOR ALLERGIES        levothyroxine 50 mcg tablet Take 50 mcg by mouth daily      magnesium 30 MG tablet Take 30 mg by mouth 2 (two) times a day      Menest 0 3 MG tablet TAKE 1 TABLET(0 3 MG) BY MOUTH DAILY 90 tablet 3    Omega-3 Fatty Acids (FISH OIL) 1000 MG CPDR Take by mouth      propranolol (INDERAL) 20 mg tablet Take 20 mg by mouth daily      acetaminophen (Tylenol) 325 mg tablet Tylenol TABS    Refills: 0       Active      cholecalciferol (VITAMIN D3) 1,000 units tablet Take 1,000 Units by mouth daily      multivitamin (THERAGRAN) TABS Take 1 tablet by mouth daily      omega-3-acid ethyl esters (LOVAZA) 1 g capsule Take 4 g by mouth daily      vitamin E, tocopherol, 1,000 units capsule Take 1,000 Units by mouth daily       No current facility-administered medications for this visit  Current Outpatient Medications on File Prior to Visit   Medication Sig    acyclovir (ZOVIRAX) 400 MG tablet Take 400 mg by mouth daily    Ascorbic Acid (VITAMIN C) 1000 MG tablet Take 1,000 mg by mouth daily    aspirin 81 mg chewable tablet Chew 81 mg daily    Aspirin-Calcium Carbonate  MG TABS TAKE 1 TABLET DAILY   calcium carbonate (OS-MIGUEL) 1250 (500 Ca) MG tablet Take 1 tablet by mouth daily    clobetasol (TEMOVATE) 0 05 % cream Apply topically 2 (two) times a day    estradiol (ESTRACE) 0 1 mg/g vaginal cream 1 gm PV qhs 2-3 times per week    fexofenadine (ALLEGRA) 180 MG tablet TAKE 1 TABLET DAILY AS NEEDED FOR ALLERGIES   levothyroxine 50 mcg tablet Take 50 mcg by mouth daily    magnesium 30 MG tablet Take 30 mg by mouth 2 (two) times a day    Omega-3 Fatty Acids (FISH OIL) 1000 MG CPDR Take by mouth    propranolol (INDERAL) 20 mg tablet Take 20 mg by mouth daily    acetaminophen (Tylenol) 325 mg tablet Tylenol TABS    Refills: 0       Active    cholecalciferol (VITAMIN D3) 1,000 units tablet Take 1,000 Units by mouth daily    multivitamin (THERAGRAN) TABS Take 1 tablet by mouth daily    omega-3-acid ethyl esters (LOVAZA) 1 g capsule Take 4 g by mouth daily    vitamin E, tocopherol, 1,000 units capsule Take 1,000 Units by mouth daily    [DISCONTINUED] Menest 0 3 MG tablet TAKE 1 TABLET(0 3 MG) BY MOUTH DAILY     No current facility-administered medications on file prior to visit  "    She is allergic to other       Review of Systems   Constitutional: Negative for activity change, appetite change, chills, fatigue and fever  HENT: Negative for rhinorrhea, sneezing and sore throat  Eyes: Negative for visual disturbance  Respiratory: Negative for cough, shortness of breath and wheezing  Cardiovascular: Negative for chest pain, palpitations and leg swelling  Gastrointestinal: Negative for abdominal distention, constipation, diarrhea, nausea and vomiting  Genitourinary: Negative for difficulty urinating, pelvic pain and vaginal discharge  Neurological: Negative for syncope and light-headedness  Objective:      /78 (BP Location: Left arm, Patient Position: Sitting, Cuff Size: Standard)   Ht 4' 10 5\" (1 486 m)   Wt 63 5 kg (140 lb)   BMI 28 76 kg/m²          Physical Exam  Constitutional:       General: She is not in acute distress  Appearance: She is well-developed  She is not diaphoretic  Chest:   Breasts:     Breasts are symmetrical       Right: No inverted nipple, mass, nipple discharge, skin change or tenderness  Left: No inverted nipple, mass, nipple discharge, skin change or tenderness  Abdominal:      General: Bowel sounds are normal  There is no distension  Palpations: Abdomen is soft  There is no mass  Tenderness: There is no abdominal tenderness  There is no guarding or rebound  Genitourinary:     Labia:         Right: No rash, tenderness, lesion or injury  Left: No rash, tenderness, lesion or injury  Vagina: No vaginal discharge or bleeding  Cervix: No cervical motion tenderness, discharge or friability  Uterus: Not deviated, not enlarged, not fixed and not tender  Adnexa:         Right: No mass, tenderness or fullness  Left: No mass, tenderness or fullness          Comments: Urethral meatus- no lesions, non tender  Urethra non tender  Bladder non tender  Thin, atrophic vaginal mucosa  Skin:     " General: Skin is warm and dry  Coloration: Skin is not pale  Findings: No erythema or rash

## 2023-07-30 ENCOUNTER — OFFICE VISIT (OUTPATIENT)
Dept: URGENT CARE | Facility: CLINIC | Age: 78
End: 2023-07-30
Payer: MEDICARE

## 2023-07-30 VITALS
OXYGEN SATURATION: 98 % | SYSTOLIC BLOOD PRESSURE: 164 MMHG | RESPIRATION RATE: 18 BRPM | HEART RATE: 78 BPM | TEMPERATURE: 98.4 F | DIASTOLIC BLOOD PRESSURE: 90 MMHG

## 2023-07-30 DIAGNOSIS — U07.1 COVID-19: Primary | ICD-10-CM

## 2023-07-30 PROCEDURE — G0463 HOSPITAL OUTPT CLINIC VISIT: HCPCS

## 2023-07-30 PROCEDURE — 99203 OFFICE O/P NEW LOW 30 MIN: CPT

## 2023-07-30 NOTE — PROGRESS NOTES
[facility]    NAME: Leigh Barone is a 78 y.o. female  : 1945    MRN: [id number]  DATE: 2023  TIME: 10:01 AM    Assessment and Plan   COVID-19 [U07.1]  1. COVID-19          At home COVID test is positive. Pt meets criteria for paxlovid due to age, HtN, [address] (had CVA but per pt unclear if CVA or not) Discussed with patient that this is an emergency use authorization medications. That known side effects include rebound COVID, kidney and liver damage. That this is a new medication so that new side effects could occur. After explaining all of this an their risk factors for COVID (limited risk factors, likely tolerated COVID without hospitalization in the past, mild symptoms at this time) and in mutual agreement decided to hold on giving paxlovid at this time. If at any points symptoms worsen within the next 2 times (within 5 day timeframe) can reconsider paxlovid dosing - phone number given so pt can reach out to provider to discuss further if needed. Continue supportive care for symptoms. Educated on return precautions to PCP or to ED. Patient Instructions       Keep hydrated and rest as able. If COVID is positive you need to quarantine until your fever is gone for 24 hours and days 0-5. The first day of symptoms is day 0. Then, you need to have strict mask wearing days 6 through 10. Notify those you were in contact with of exposure from 2 days before you had symptoms through your positive test result. Wear your mask and wash hands often. PCP follow up in 3-5 days; call them first  Go to an emergency department if symptoms worsen, especially shortness or breath, weakness, or if unable to tolerate fluid intake.        Chief Complaint     Chief Complaint   Patient presents with   • COVID-19     Pt tested positive for covid at home yesterday symptoms of fever, headache, body aches started on friday         History of Present Illness       Presents with sick symptoms including fever, chills, headache, myalgia that began Friday, 2 days ago at night. She took 3 at home tests which were positive. She feels unwell but denies shortness of breath, chest pain, dizziness, or weakness symptoms. She is vaccinated x5. PMH includes stroke but per pt unclear if a stroke or not, hypothyroidism, hypertension, hyperlipidemia. She believes she had COVID at the start (+ antibodies per pt) and was sick for 3 weeks but did not require hospital stay. Review of Systems   Review of Systems   Constitutional: Positive for chills, fatigue and fever. HENT: Negative for congestion and sore throat. Respiratory: Negative for cough, shortness of breath and wheezing. Cardiovascular: Negative for chest pain. Gastrointestinal: Negative for abdominal pain. Genitourinary: Negative for dysuria. Musculoskeletal: Positive for myalgias. Neurological: Positive for headaches. Negative for dizziness. Psychiatric/Behavioral: Negative for confusion. Current Medications       Current Outpatient Medications:   •  acyclovir (ZOVIRAX) 400 MG tablet, Take 400 mg by mouth daily, Disp: , Rfl:   •  Ascorbic Acid (VITAMIN C) 1000 MG tablet, Take 1,000 mg by mouth daily, Disp: , Rfl:   •  aspirin 81 mg chewable tablet, Chew 81 mg daily, Disp: , Rfl:   •  Aspirin-Calcium Carbonate  MG TABS, TAKE 1 TABLET DAILY. , Disp: , Rfl:   •  calcium carbonate (OS-MIGUEL) 1250 (500 Ca) MG tablet, Take 1 tablet by mouth daily, Disp: , Rfl:   •  clobetasol (TEMOVATE) 0.05 % cream, Apply topically 2 (two) times a day, Disp: 30 g, Rfl: 0  •  estradiol (ESTRACE) 0.1 mg/g vaginal cream, 1 gm PV qhs 2-3 times per week, Disp: 42.5 g, Rfl: 3  •  fexofenadine (ALLEGRA) 180 MG tablet, TAKE 1 TABLET DAILY AS NEEDED FOR ALLERGIES., Disp: , Rfl:   •  levothyroxine 50 mcg tablet, Take 50 mcg by mouth daily, Disp: , Rfl:   •  magnesium 30 MG tablet, Take 30 mg by mouth 2 (two) times a day, Disp: , Rfl:   •  Menest 0.3 MG tablet, TAKE 1 TABLET(0.3 MG) BY MOUTH DAILY, Disp: 90 tablet, Rfl: 3  •  Omega-3 Fatty Acids (FISH OIL) 1000 MG CPDR, Take by mouth, Disp: , Rfl:   •  omega-3-acid ethyl esters (LOVAZA) 1 g capsule, Take 4 g by mouth daily, Disp: , Rfl:   •  propranolol (INDERAL) 20 mg tablet, Take 20 mg by mouth daily, Disp: , Rfl:   •  acetaminophen (Tylenol) 325 mg tablet, Tylenol TABS   Refills: 0     Active, Disp: , Rfl:   •  cholecalciferol (VITAMIN D3) 1,000 units tablet, Take 1,000 Units by mouth daily, Disp: , Rfl:   •  multivitamin (THERAGRAN) TABS, Take 1 tablet by mouth daily, Disp: , Rfl:   •  vitamin E, tocopherol, 1,000 units capsule, Take 1,000 Units by mouth daily, Disp: , Rfl:     Current Allergies     Allergies as of 07/30/2023 - Reviewed 07/30/2023   Allergen Reaction Noted   • Other Hives 11/15/2017            The following portions of the patient's history were reviewed and updated as appropriate: allergies, current medications, past family history, past medical history, past social history, past surgical history and problem list.     Past Medical History:   Diagnosis Date   • Abnormal Pap smear of cervix    • Disease of thyroid gland    • Fibroid 1998    Hysterectomy done in 2000   • Fibroids    • Hyperthyroidism Don't remember    Take levothyroxine daily   • Migraine    • Migraines    • Recurrent herpes labialis 10/12/2020       Past Surgical History:   Procedure Laterality Date   • ANKLE FRACTURE SURGERY Left    • HYSTERECTOMY      56years old   • [place] COLONOSCOPY FLX DX W/COLLJ SPEC WHEN PFRMD N/A 11/15/2017    Procedure: COLONOSCOPY;  Surgeon: Marisela Connor MD;  Location: [facility];   Service: Gastroenterology   • [place] CONIZATION CERVIX W/WO D&C RPR KNIFE/LASER  1979   • SKIN LESION EXCISION  01/2020    chest   • TIBIAL PLATEAU HARDWARE REMOVAL Left    • TONSILLECTOMY         Family History   Problem Relation Age of Onset   • Lymphoma Mother    • Hypertension Mother    • Hyperlipidemia Mother    • Cancer Mother Non-Hodgkin's Lymphoma   • Colon cancer Father    • Lung cancer Father    • No Known Problems Maternal Grandmother    • No Known Problems Maternal Grandfather    • No Known Problems Paternal Grandmother    • No Known Problems Paternal Grandfather    • Stomach cancer Maternal Aunt    • No Known Problems Paternal Aunt    • No Known Problems Paternal Aunt    • No Known Problems Paternal Aunt    • No Known Problems Paternal Aunt    • No Known Problems Paternal Aunt    • No Known Problems Paternal Aunt    • No Known Problems Paternal Aunt    • Breast cancer Neg Hx          Medications have been verified. Objective   /90   Pulse 78   Temp 98.4 °F (36.9 °C) (Temporal)   Resp 18   SpO2 98%        Physical Exam     Physical Exam  Vitals reviewed. Constitutional:       General: She is not in acute distress. Appearance: Normal appearance. HENT:      Right Ear: Tympanic membrane, ear canal and external ear normal.      Left Ear: Tympanic membrane, ear canal and external ear normal.      Nose: Nose normal.      Mouth/Throat:      Mouth: Mucous membranes are moist.      Pharynx: No posterior oropharyngeal erythema. Eyes:      Conjunctiva/sclera: Conjunctivae normal.   Cardiovascular:      Rate and Rhythm: Normal rate and regular rhythm. Pulses: Normal pulses. Heart sounds: Normal heart sounds. No murmur heard. Pulmonary:      Effort: Pulmonary effort is normal. No respiratory distress. Breath sounds: Normal breath sounds. Skin:     General: Skin is warm and dry. Neurological:      General: No focal deficit present. Mental Status: She is alert and oriented to person, place, and time.    Psychiatric:         Mood and Affect: Mood normal.         Behavior: Behavior normal.

## 2023-07-30 NOTE — PATIENT INSTRUCTIONS
Continue supportive care for treatment if symptoms get worse between today and Tuesday night call [phone] and ask for [facility] to further discuss treatment options. If shortness of breath, chest pain, dizziness, weakness go to the ER.

## 2023-07-30 NOTE — LETTER
July 30, 2023     Patient: Sissy Malcolm   YOB: 1945   Date of Visit: 7/30/2023       To Whom it May Concern:    Sissy Malcolm was seen in my clinic on 7/30/2023. She should be excused through 8/3/23. If you have any questions or concerns, please don't hesitate to call.          Sincerely,          NICHOLAS Valente        CC: No Recipients

## 2023-11-03 ENCOUNTER — APPOINTMENT (OUTPATIENT)
Dept: LAB | Facility: CLINIC | Age: 78
End: 2023-11-03
Payer: MEDICARE

## 2023-11-03 DIAGNOSIS — Z00.00 ROUTINE GENERAL MEDICAL EXAMINATION AT A HEALTH CARE FACILITY: ICD-10-CM

## 2023-11-03 DIAGNOSIS — R73.01 IMPAIRED FASTING GLUCOSE: ICD-10-CM

## 2023-11-03 DIAGNOSIS — E03.9 MYXEDEMA HEART DISEASE: ICD-10-CM

## 2023-11-03 DIAGNOSIS — I51.9 MYXEDEMA HEART DISEASE: ICD-10-CM

## 2023-11-03 LAB
ALBUMIN SERPL BCP-MCNC: 3.8 G/DL (ref 3.5–5)
ALP SERPL-CCNC: 59 U/L (ref 34–104)
ALT SERPL W P-5'-P-CCNC: 19 U/L (ref 7–52)
ANION GAP SERPL CALCULATED.3IONS-SCNC: 5 MMOL/L
AST SERPL W P-5'-P-CCNC: 21 U/L (ref 13–39)
BASOPHILS # BLD AUTO: 0.02 THOUSANDS/ÂΜL (ref 0–0.1)
BASOPHILS NFR BLD AUTO: 1 % (ref 0–1)
BILIRUB SERPL-MCNC: 0.47 MG/DL (ref 0.2–1)
BUN SERPL-MCNC: 13 MG/DL (ref 5–25)
CALCIUM SERPL-MCNC: 9.2 MG/DL (ref 8.4–10.2)
CHLORIDE SERPL-SCNC: 99 MMOL/L (ref 96–108)
CHOLEST SERPL-MCNC: 221 MG/DL
CO2 SERPL-SCNC: 32 MMOL/L (ref 21–32)
CREAT SERPL-MCNC: 0.78 MG/DL (ref 0.6–1.3)
EOSINOPHIL # BLD AUTO: 0.15 THOUSAND/ÂΜL (ref 0–0.61)
EOSINOPHIL NFR BLD AUTO: 3 % (ref 0–6)
ERYTHROCYTE [DISTWIDTH] IN BLOOD BY AUTOMATED COUNT: 15.6 % (ref 11.6–15.1)
EST. AVERAGE GLUCOSE BLD GHB EST-MCNC: 126 MG/DL
GFR SERPL CREATININE-BSD FRML MDRD: 74 ML/MIN/1.73SQ M
GLUCOSE P FAST SERPL-MCNC: 83 MG/DL (ref 65–99)
HBA1C MFR BLD: 6 %
HCT VFR BLD AUTO: 44.1 % (ref 34.8–46.1)
HDLC SERPL-MCNC: 60 MG/DL
HGB BLD-MCNC: 14.2 G/DL (ref 11.5–15.4)
IMM GRANULOCYTES # BLD AUTO: 0.01 THOUSAND/UL (ref 0–0.2)
IMM GRANULOCYTES NFR BLD AUTO: 0 % (ref 0–2)
LDLC SERPL CALC-MCNC: 134 MG/DL (ref 0–100)
LYMPHOCYTES # BLD AUTO: 1.51 THOUSANDS/ÂΜL (ref 0.6–4.47)
LYMPHOCYTES NFR BLD AUTO: 35 % (ref 14–44)
MCH RBC QN AUTO: 31 PG (ref 26.8–34.3)
MCHC RBC AUTO-ENTMCNC: 32.2 G/DL (ref 31.4–37.4)
MCV RBC AUTO: 96 FL (ref 82–98)
MONOCYTES # BLD AUTO: 0.72 THOUSAND/ÂΜL (ref 0.17–1.22)
MONOCYTES NFR BLD AUTO: 17 % (ref 4–12)
NEUTROPHILS # BLD AUTO: 1.95 THOUSANDS/ÂΜL (ref 1.85–7.62)
NEUTS SEG NFR BLD AUTO: 44 % (ref 43–75)
NONHDLC SERPL-MCNC: 161 MG/DL
NRBC BLD AUTO-RTO: 0 /100 WBCS
PLATELET # BLD AUTO: 221 THOUSANDS/UL (ref 149–390)
PMV BLD AUTO: 10.6 FL (ref 8.9–12.7)
POTASSIUM SERPL-SCNC: 4.1 MMOL/L (ref 3.5–5.3)
PROT SERPL-MCNC: 6.4 G/DL (ref 6.4–8.4)
RBC # BLD AUTO: 4.58 MILLION/UL (ref 3.81–5.12)
SODIUM SERPL-SCNC: 136 MMOL/L (ref 135–147)
TRIGL SERPL-MCNC: 134 MG/DL
TSH SERPL DL<=0.05 MIU/L-ACNC: 1.71 UIU/ML (ref 0.45–4.5)
WBC # BLD AUTO: 4.36 THOUSAND/UL (ref 4.31–10.16)

## 2023-11-03 PROCEDURE — 80053 COMPREHEN METABOLIC PANEL: CPT

## 2023-11-03 PROCEDURE — 80061 LIPID PANEL: CPT

## 2023-11-03 PROCEDURE — 83036 HEMOGLOBIN GLYCOSYLATED A1C: CPT

## 2023-11-03 PROCEDURE — 84443 ASSAY THYROID STIM HORMONE: CPT

## 2023-11-03 PROCEDURE — 36415 COLL VENOUS BLD VENIPUNCTURE: CPT

## 2023-11-03 PROCEDURE — 85025 COMPLETE CBC W/AUTO DIFF WBC: CPT

## 2024-02-21 PROBLEM — Z01.419 ENCOUNTER FOR GYNECOLOGICAL EXAMINATION (GENERAL) (ROUTINE) WITHOUT ABNORMAL FINDINGS: Status: RESOLVED | Noted: 2018-12-11 | Resolved: 2024-02-21

## 2024-03-26 ENCOUNTER — TELEPHONE (OUTPATIENT)
Age: 79
End: 2024-03-26

## 2024-03-26 DIAGNOSIS — Z12.11 ENCOUNTER FOR SCREENING COLONOSCOPY: Primary | ICD-10-CM

## 2024-04-02 ENCOUNTER — PATIENT MESSAGE (OUTPATIENT)
Dept: OBGYN CLINIC | Facility: CLINIC | Age: 79
End: 2024-04-02

## 2024-04-02 DIAGNOSIS — Z78.0 POSTMENOPAUSAL: ICD-10-CM

## 2024-04-02 DIAGNOSIS — Z01.419 ENCOUNTER FOR GYNECOLOGICAL EXAMINATION (GENERAL) (ROUTINE) WITHOUT ABNORMAL FINDINGS: ICD-10-CM

## 2024-04-03 RX ORDER — ESTRADIOL 0.1 MG/G
CREAM VAGINAL
Qty: 42.5 G | Refills: 3 | Status: SHIPPED | OUTPATIENT
Start: 2024-04-03

## 2024-07-29 DIAGNOSIS — Z78.0 POSTMENOPAUSAL: ICD-10-CM
